# Patient Record
Sex: FEMALE | HISPANIC OR LATINO | Employment: UNEMPLOYED | URBAN - METROPOLITAN AREA
[De-identification: names, ages, dates, MRNs, and addresses within clinical notes are randomized per-mention and may not be internally consistent; named-entity substitution may affect disease eponyms.]

---

## 2017-11-13 ENCOUNTER — HOSPITAL ENCOUNTER (EMERGENCY)
Facility: MEDICAL CENTER | Age: 22
End: 2017-11-13
Attending: EMERGENCY MEDICINE
Payer: COMMERCIAL

## 2017-11-13 ENCOUNTER — APPOINTMENT (OUTPATIENT)
Dept: RADIOLOGY | Facility: MEDICAL CENTER | Age: 22
End: 2017-11-13
Attending: EMERGENCY MEDICINE
Payer: COMMERCIAL

## 2017-11-13 VITALS
HEIGHT: 60 IN | BODY MASS INDEX: 22.16 KG/M2 | DIASTOLIC BLOOD PRESSURE: 71 MMHG | SYSTOLIC BLOOD PRESSURE: 112 MMHG | RESPIRATION RATE: 18 BRPM | TEMPERATURE: 98.6 F | HEART RATE: 65 BPM | WEIGHT: 112.88 LBS | OXYGEN SATURATION: 97 %

## 2017-11-13 DIAGNOSIS — N93.9 ABNORMAL UTERINE BLEEDING: ICD-10-CM

## 2017-11-13 LAB
ALBUMIN SERPL BCP-MCNC: 4 G/DL (ref 3.2–4.9)
ALBUMIN/GLOB SERPL: 1.3 G/DL
ALP SERPL-CCNC: 67 U/L (ref 30–99)
ALT SERPL-CCNC: 13 U/L (ref 2–50)
ANION GAP SERPL CALC-SCNC: 6 MMOL/L (ref 0–11.9)
APPEARANCE UR: CLEAR
AST SERPL-CCNC: 16 U/L (ref 12–45)
BASOPHILS # BLD AUTO: 0.6 % (ref 0–1.8)
BASOPHILS # BLD: 0.05 K/UL (ref 0–0.12)
BILIRUB SERPL-MCNC: 0.6 MG/DL (ref 0.1–1.5)
BUN SERPL-MCNC: 14 MG/DL (ref 8–22)
CALCIUM SERPL-MCNC: 9.4 MG/DL (ref 8.5–10.5)
CHLORIDE SERPL-SCNC: 107 MMOL/L (ref 96–112)
CO2 SERPL-SCNC: 23 MMOL/L (ref 20–33)
COLOR UR AUTO: YELLOW
CREAT SERPL-MCNC: 0.44 MG/DL (ref 0.5–1.4)
EOSINOPHIL # BLD AUTO: 0.13 K/UL (ref 0–0.51)
EOSINOPHIL NFR BLD: 1.5 % (ref 0–6.9)
ERYTHROCYTE [DISTWIDTH] IN BLOOD BY AUTOMATED COUNT: 40.8 FL (ref 35.9–50)
GFR SERPL CREATININE-BSD FRML MDRD: >60 ML/MIN/1.73 M 2
GLOBULIN SER CALC-MCNC: 3.2 G/DL (ref 1.9–3.5)
GLUCOSE SERPL-MCNC: 94 MG/DL (ref 65–99)
GLUCOSE UR QL STRIP.AUTO: NEGATIVE MG/DL
HCG SERPL QL: NEGATIVE
HCG UR QL: NEGATIVE
HCT VFR BLD AUTO: 41.2 % (ref 37–47)
HGB BLD-MCNC: 14 G/DL (ref 12–16)
IMM GRANULOCYTES # BLD AUTO: 0.05 K/UL (ref 0–0.11)
IMM GRANULOCYTES NFR BLD AUTO: 0.6 % (ref 0–0.9)
KETONES UR QL STRIP.AUTO: NEGATIVE MG/DL
LEUKOCYTE ESTERASE UR QL STRIP.AUTO: NEGATIVE
LYMPHOCYTES # BLD AUTO: 1.87 K/UL (ref 1–4.8)
LYMPHOCYTES NFR BLD: 20.9 % (ref 22–41)
MCH RBC QN AUTO: 30.8 PG (ref 27–33)
MCHC RBC AUTO-ENTMCNC: 34 G/DL (ref 33.6–35)
MCV RBC AUTO: 90.5 FL (ref 81.4–97.8)
MONOCYTES # BLD AUTO: 0.54 K/UL (ref 0–0.85)
MONOCYTES NFR BLD AUTO: 6 % (ref 0–13.4)
NEUTROPHILS # BLD AUTO: 6.3 K/UL (ref 2–7.15)
NEUTROPHILS NFR BLD: 70.4 % (ref 44–72)
NITRITE UR QL STRIP.AUTO: NEGATIVE
NRBC # BLD AUTO: 0 K/UL
NRBC BLD AUTO-RTO: 0 /100 WBC
NUMBER OF RH DOSES IND 8505RD: NORMAL
PH UR STRIP.AUTO: 6 [PH]
PLATELET # BLD AUTO: 263 K/UL (ref 164–446)
PMV BLD AUTO: 10.9 FL (ref 9–12.9)
POTASSIUM SERPL-SCNC: 3.8 MMOL/L (ref 3.6–5.5)
PROT SERPL-MCNC: 7.2 G/DL (ref 6–8.2)
PROT UR QL STRIP: NEGATIVE MG/DL
RBC # BLD AUTO: 4.55 M/UL (ref 4.2–5.4)
RBC UR QL AUTO: ABNORMAL
RH BLD: NORMAL
SODIUM SERPL-SCNC: 136 MMOL/L (ref 135–145)
SP GR UR: 1.02
WBC # BLD AUTO: 8.9 K/UL (ref 4.8–10.8)

## 2017-11-13 PROCEDURE — 86901 BLOOD TYPING SEROLOGIC RH(D): CPT

## 2017-11-13 PROCEDURE — 76830 TRANSVAGINAL US NON-OB: CPT

## 2017-11-13 PROCEDURE — 84703 CHORIONIC GONADOTROPIN ASSAY: CPT

## 2017-11-13 PROCEDURE — 80053 COMPREHEN METABOLIC PANEL: CPT

## 2017-11-13 PROCEDURE — 36415 COLL VENOUS BLD VENIPUNCTURE: CPT

## 2017-11-13 PROCEDURE — 85025 COMPLETE CBC W/AUTO DIFF WBC: CPT

## 2017-11-13 PROCEDURE — 99284 EMERGENCY DEPT VISIT MOD MDM: CPT

## 2017-11-13 PROCEDURE — 81025 URINE PREGNANCY TEST: CPT

## 2017-11-13 PROCEDURE — 81002 URINALYSIS NONAUTO W/O SCOPE: CPT

## 2017-11-14 NOTE — ED NOTES
"Pt brought back from [facility], ambulatory with steady gait.     Pt c/o heavy vaginal bleeding x 2 days. States yesterday it was light and today states \"I have already gone through a whole box of pads.\" pt states last week she also had two days of heavy vaginal bleeding. Also states lower pelvic cramping. Pt denies pregnancy. A/o x4, speaking in full sentences.   "

## 2017-11-14 NOTE — ED PROVIDER NOTES
ED Provider Note    CHIEF COMPLAINT  Chief Complaint   Patient presents with   • Vaginal Bleeding       HPI  Josie Prajapati is a 22 y.o. female who presentsTo the emergency department stating that she is having a very heavy menstrual period. The patient says that approximately 2 months ago she had an implantable birth control removed from her left upper arm by her doctor in [facility]. She then experienced some abnormal bleeding for a few days earlier this month and was expecting her regular period began on the 15th but it began today with much heavier bleeding than usual. She is having some slight crampy abdominal discomfort. She has come in for evaluation. She has now moved to [place] and has not yet established a gynecologist in this community    REVIEW OF SYSTEMS no fever or chills cough and difficulty breathing no back pain. No abnormal vaginal discharge except for abnormal bleeding as noted above. All other systems negative    PAST MEDICAL HISTORY  History reviewed. No pertinent past medical history.    FAMILY HISTORY  History reviewed. No pertinent family history.    SOCIAL HISTORY  Social History     Social History   • Marital status: Single     Spouse name: N/A   • Number of children: N/A   • Years of education: N/A     Social History Main Topics   • Smoking status: Never Smoker   • Smokeless tobacco: Never Used   • Alcohol use No   • Drug use: No   • Sexual activity: Not on file     Other Topics Concern   • Not on file     Social History Narrative   • No narrative on file       SURGICAL HISTORY  History reviewed. No pertinent surgical history.    CURRENT MEDICATIONS  Home Medications     Reviewed by Michelle Valencia R.N. (Registered Nurse) on 11/13/17 at 5001  Med List Status: Complete   Medication Last Dose Status        Patient Liborio Taking any Medications                       ALLERGIES  No Known Allergies    PHYSICAL EXAM  VITAL SIGNS: /71   Pulse 65   Temp 37 °C (98.6 °F)  (Temporal)   Resp 18   Ht 1.524 m (5')   Wt 51.2 kg (112 lb 14 oz)   SpO2 97%   BMI 22.04 kg/m²    Oxygen saturation is interpreted asAdequate  Constitutional: Awake and nontoxic appearing  HENT: Mucous membranes are moist and throat clear  Eyes: No erythema or discharge or jaundice  Neck: Trachea midline no JVD  Cardiovascular: Regular rate and rhythm  Lungs: Clear and equal bilaterally with no apparent difficulty breathing  Abdomen/Back: Soft nontender nondistended no rebound or guarding or peritoneal findings L sounds are normally active  Skin: Arm and dry  Musculoskeletal: No acute bony deformity  Neurologic: Awake verbal moving all extremities without difficulty    Laboratory  A CBC shows white blood cell count 8.9 with hemoglobin of 14 complete metabolic panel is unremarkable urine pregnancy test is negative Rh is positive    Radiology  US-GYN-PELVIS TRANSVAGINAL   Final Result      Normal transvaginal appearance of the pelvis.          MEDICAL DECISION MAKING and DISPOSITION  I reviewed all the findings with the patient and at this point in time I think it will for her to go home I think she needs to follow-up with gynecology and she is to call Dr. bonds's office in the morning and arrange gynecology follow-up as soon as possible. If pain is out of control or she feels she is having new or worsening symptoms same in return here for recheck    IMPRESSION  1. Abnormal uterine bleeding         Electronically signed by: Manpreet Brock, 11/13/2017 10:09 PM

## 2017-11-14 NOTE — ED NOTES
Pt comes in complaining of a heavy menstrual cycle and pelvic cramping. Pt stating she was bleeding last week and it stopped then began again last night.

## 2018-05-07 ENCOUNTER — HOSPITAL ENCOUNTER (OUTPATIENT)
Facility: MEDICAL CENTER | Age: 23
End: 2018-05-07
Attending: SPECIALIST | Admitting: SPECIALIST
Payer: MEDICAID

## 2018-08-21 ENCOUNTER — HOSPITAL ENCOUNTER (INPATIENT)
Facility: MEDICAL CENTER | Age: 23
LOS: 1 days | End: 2018-08-22
Attending: SPECIALIST | Admitting: SPECIALIST
Payer: MEDICAID

## 2018-08-21 DIAGNOSIS — R10.2 FEMALE PELVIC PAIN: Primary | ICD-10-CM

## 2018-08-21 LAB
BASOPHILS # BLD AUTO: 0.4 % (ref 0–1.8)
BASOPHILS # BLD: 0.03 K/UL (ref 0–0.12)
EOSINOPHIL # BLD AUTO: 0.06 K/UL (ref 0–0.51)
EOSINOPHIL NFR BLD: 0.8 % (ref 0–6.9)
ERYTHROCYTE [DISTWIDTH] IN BLOOD BY AUTOMATED COUNT: 42.2 FL (ref 35.9–50)
ERYTHROCYTE [DISTWIDTH] IN BLOOD BY AUTOMATED COUNT: 42.3 FL (ref 35.9–50)
HCT VFR BLD AUTO: 36.8 % (ref 37–47)
HCT VFR BLD AUTO: 38.3 % (ref 37–47)
HGB BLD-MCNC: 12.4 G/DL (ref 12–16)
HGB BLD-MCNC: 12.4 G/DL (ref 12–16)
HOLDING TUBE BB 8507: NORMAL
IMM GRANULOCYTES # BLD AUTO: 0.07 K/UL (ref 0–0.11)
IMM GRANULOCYTES NFR BLD AUTO: 0.9 % (ref 0–0.9)
LYMPHOCYTES # BLD AUTO: 2.11 K/UL (ref 1–4.8)
LYMPHOCYTES NFR BLD: 27.8 % (ref 22–41)
MCH RBC QN AUTO: 27.8 PG (ref 27–33)
MCH RBC QN AUTO: 28.7 PG (ref 27–33)
MCHC RBC AUTO-ENTMCNC: 32.4 G/DL (ref 33.6–35)
MCHC RBC AUTO-ENTMCNC: 33.7 G/DL (ref 33.6–35)
MCV RBC AUTO: 85.2 FL (ref 81.4–97.8)
MCV RBC AUTO: 85.9 FL (ref 81.4–97.8)
MONOCYTES # BLD AUTO: 0.55 K/UL (ref 0–0.85)
MONOCYTES NFR BLD AUTO: 7.3 % (ref 0–13.4)
NEUTROPHILS # BLD AUTO: 4.76 K/UL (ref 2–7.15)
NEUTROPHILS NFR BLD: 62.8 % (ref 44–72)
NRBC # BLD AUTO: 0 K/UL
NRBC BLD-RTO: 0 /100 WBC
PLATELET # BLD AUTO: 177 K/UL (ref 164–446)
PLATELET # BLD AUTO: 178 K/UL (ref 164–446)
PMV BLD AUTO: 11.7 FL (ref 9–12.9)
PMV BLD AUTO: 12.7 FL (ref 9–12.9)
RBC # BLD AUTO: 4.32 M/UL (ref 4.2–5.4)
RBC # BLD AUTO: 4.46 M/UL (ref 4.2–5.4)
WBC # BLD AUTO: 15.3 K/UL (ref 4.8–10.8)
WBC # BLD AUTO: 7.6 K/UL (ref 4.8–10.8)

## 2018-08-21 PROCEDURE — 10907ZC DRAINAGE OF AMNIOTIC FLUID, THERAPEUTIC FROM PRODUCTS OF CONCEPTION, VIA NATURAL OR ARTIFICIAL OPENING: ICD-10-PCS | Performed by: SPECIALIST

## 2018-08-21 PROCEDURE — 304965 HCHG RECOVERY SERVICES

## 2018-08-21 PROCEDURE — 36415 COLL VENOUS BLD VENIPUNCTURE: CPT

## 2018-08-21 PROCEDURE — 770002 HCHG ROOM/CARE - OB PRIVATE (112)

## 2018-08-21 PROCEDURE — 85027 COMPLETE CBC AUTOMATED: CPT

## 2018-08-21 PROCEDURE — 700111 HCHG RX REV CODE 636 W/ 250 OVERRIDE (IP): Performed by: SPECIALIST

## 2018-08-21 PROCEDURE — 59409 OBSTETRICAL CARE: CPT

## 2018-08-21 PROCEDURE — 85025 COMPLETE CBC W/AUTO DIFF WBC: CPT

## 2018-08-21 PROCEDURE — 700105 HCHG RX REV CODE 258: Performed by: SPECIALIST

## 2018-08-21 PROCEDURE — A9270 NON-COVERED ITEM OR SERVICE: HCPCS | Performed by: SPECIALIST

## 2018-08-21 PROCEDURE — 700102 HCHG RX REV CODE 250 W/ 637 OVERRIDE(OP): Performed by: SPECIALIST

## 2018-08-21 PROCEDURE — 0HQ9XZZ REPAIR PERINEUM SKIN, EXTERNAL APPROACH: ICD-10-PCS | Performed by: SPECIALIST

## 2018-08-21 RX ORDER — TERBUTALINE SULFATE 1 MG/ML
0.25 INJECTION, SOLUTION SUBCUTANEOUS PRN
Status: DISCONTINUED | OUTPATIENT
Start: 2018-08-21 | End: 2018-08-21 | Stop reason: HOSPADM

## 2018-08-21 RX ORDER — BISACODYL 10 MG
10 SUPPOSITORY, RECTAL RECTAL PRN
Status: DISCONTINUED | OUTPATIENT
Start: 2018-08-21 | End: 2018-08-22 | Stop reason: HOSPADM

## 2018-08-21 RX ORDER — SODIUM CHLORIDE, SODIUM LACTATE, POTASSIUM CHLORIDE, CALCIUM CHLORIDE 600; 310; 30; 20 MG/100ML; MG/100ML; MG/100ML; MG/100ML
INJECTION, SOLUTION INTRAVENOUS CONTINUOUS
Status: DISCONTINUED | OUTPATIENT
Start: 2018-08-21 | End: 2018-08-21 | Stop reason: HOSPADM

## 2018-08-21 RX ORDER — VITAMIN A ACETATE, BETA CAROTENE, ASCORBIC ACID, CHOLECALCIFEROL, .ALPHA.-TOCOPHEROL ACETATE, DL-, THIAMINE MONONITRATE, RIBOFLAVIN, NIACINAMIDE, PYRIDOXINE HYDROCHLORIDE, FOLIC ACID, CYANOCOBALAMIN, CALCIUM CARBONATE, FERROUS FUMARATE, ZINC OXIDE, CUPRIC OXIDE 3080; 12; 120; 400; 1; 1.84; 3; 20; 22; 920; 25; 200; 27; 10; 2 [IU]/1; UG/1; MG/1; [IU]/1; MG/1; MG/1; MG/1; MG/1; MG/1; [IU]/1; MG/1; MG/1; MG/1; MG/1; MG/1
1 TABLET, FILM COATED ORAL EVERY MORNING
Status: DISCONTINUED | OUTPATIENT
Start: 2018-08-21 | End: 2018-08-22 | Stop reason: HOSPADM

## 2018-08-21 RX ORDER — DOCUSATE SODIUM 100 MG/1
100 CAPSULE, LIQUID FILLED ORAL 2 TIMES DAILY PRN
Status: DISCONTINUED | OUTPATIENT
Start: 2018-08-21 | End: 2018-08-22 | Stop reason: HOSPADM

## 2018-08-21 RX ORDER — MISOPROSTOL 200 UG/1
600 TABLET ORAL
Status: DISCONTINUED | OUTPATIENT
Start: 2018-08-21 | End: 2018-08-22 | Stop reason: HOSPADM

## 2018-08-21 RX ORDER — IBUPROFEN 600 MG/1
600 TABLET ORAL EVERY 6 HOURS PRN
Status: DISCONTINUED | OUTPATIENT
Start: 2018-08-21 | End: 2018-08-22 | Stop reason: HOSPADM

## 2018-08-21 RX ORDER — ONDANSETRON 2 MG/ML
4 INJECTION INTRAMUSCULAR; INTRAVENOUS EVERY 6 HOURS PRN
Status: DISCONTINUED | OUTPATIENT
Start: 2018-08-21 | End: 2018-08-22 | Stop reason: HOSPADM

## 2018-08-21 RX ORDER — LIDOCAINE HYDROCHLORIDE 10 MG/ML
INJECTION, SOLUTION EPIDURAL; INFILTRATION; INTRACAUDAL; PERINEURAL
Status: ACTIVE
Start: 2018-08-21 | End: 2018-08-22

## 2018-08-21 RX ORDER — SODIUM CHLORIDE, SODIUM LACTATE, POTASSIUM CHLORIDE, CALCIUM CHLORIDE 600; 310; 30; 20 MG/100ML; MG/100ML; MG/100ML; MG/100ML
INJECTION, SOLUTION INTRAVENOUS PRN
Status: DISCONTINUED | OUTPATIENT
Start: 2018-08-21 | End: 2018-08-22 | Stop reason: HOSPADM

## 2018-08-21 RX ORDER — HYDROCODONE BITARTRATE AND ACETAMINOPHEN 5; 325 MG/1; MG/1
1 TABLET ORAL EVERY 4 HOURS PRN
Status: DISCONTINUED | OUTPATIENT
Start: 2018-08-21 | End: 2018-08-22 | Stop reason: HOSPADM

## 2018-08-21 RX ORDER — HYDROXYZINE 50 MG/1
50 TABLET, FILM COATED ORAL EVERY 6 HOURS PRN
Status: DISCONTINUED | OUTPATIENT
Start: 2018-08-21 | End: 2018-08-21 | Stop reason: HOSPADM

## 2018-08-21 RX ORDER — MISOPROSTOL 200 UG/1
800 TABLET ORAL
Status: DISCONTINUED | OUTPATIENT
Start: 2018-08-21 | End: 2018-08-21 | Stop reason: HOSPADM

## 2018-08-21 RX ORDER — ONDANSETRON 4 MG/1
4 TABLET, ORALLY DISINTEGRATING ORAL EVERY 6 HOURS PRN
Status: DISCONTINUED | OUTPATIENT
Start: 2018-08-21 | End: 2018-08-22 | Stop reason: HOSPADM

## 2018-08-21 RX ORDER — HYDROCODONE BITARTRATE AND ACETAMINOPHEN 10; 325 MG/1; MG/1
1 TABLET ORAL EVERY 4 HOURS PRN
Status: DISCONTINUED | OUTPATIENT
Start: 2018-08-21 | End: 2018-08-22 | Stop reason: HOSPADM

## 2018-08-21 RX ORDER — ACETAMINOPHEN 325 MG/1
325 TABLET ORAL EVERY 4 HOURS PRN
Status: DISCONTINUED | OUTPATIENT
Start: 2018-08-21 | End: 2018-08-22 | Stop reason: HOSPADM

## 2018-08-21 RX ORDER — CITRIC ACID/SODIUM CITRATE 334-500MG
30 SOLUTION, ORAL ORAL EVERY 6 HOURS PRN
Status: DISCONTINUED | OUTPATIENT
Start: 2018-08-21 | End: 2018-08-21 | Stop reason: HOSPADM

## 2018-08-21 RX ADMIN — IBUPROFEN 600 MG: 600 TABLET, FILM COATED ORAL at 13:33

## 2018-08-21 RX ADMIN — Medication 125 ML/HR: at 13:33

## 2018-08-21 RX ADMIN — Medication 2 MILLI-UNITS/MIN: at 06:10

## 2018-08-21 RX ADMIN — SODIUM CHLORIDE, POTASSIUM CHLORIDE, SODIUM LACTATE AND CALCIUM CHLORIDE 125 ML: 600; 310; 30; 20 INJECTION, SOLUTION INTRAVENOUS at 06:10

## 2018-08-21 RX ADMIN — FENTANYL CITRATE 100 MCG: 50 INJECTION, SOLUTION INTRAMUSCULAR; INTRAVENOUS at 10:20

## 2018-08-21 RX ADMIN — IBUPROFEN 600 MG: 600 TABLET, FILM COATED ORAL at 19:20

## 2018-08-21 RX ADMIN — HYDROCODONE BITARTRATE AND ACETAMINOPHEN 1 TABLET: 5; 325 TABLET ORAL at 16:21

## 2018-08-21 ASSESSMENT — LIFESTYLE VARIABLES
EVER_SMOKED: NEVER
ALCOHOL_USE: NO

## 2018-08-21 ASSESSMENT — PATIENT HEALTH QUESTIONNAIRE - PHQ9
3. TROUBLE FALLING OR STAYING ASLEEP OR SLEEPING TOO MUCH: NOT AT ALL
7. TROUBLE CONCENTRATING ON THINGS, SUCH AS READING THE NEWSPAPER OR WATCHING TELEVISION: MORE THAN HALF THE DAYS
6. FEELING BAD ABOUT YOURSELF - OR THAT YOU ARE A FAILURE OR HAVE LET YOURSELF OR YOUR FAMILY DOWN: NEARLY EVERY DAY
5. POOR APPETITE OR OVEREATING: NOT AT ALL
1. LITTLE INTEREST OR PLEASURE IN DOING THINGS: NOT AT ALL
8. MOVING OR SPEAKING SO SLOWLY THAT OTHER PEOPLE COULD HAVE NOTICED. OR THE OPPOSITE, BEING SO FIGETY OR RESTLESS THAT YOU HAVE BEEN MOVING AROUND A LOT MORE THAN USUAL: NOT AT ALL
9. THOUGHTS THAT YOU WOULD BE BETTER OFF DEAD, OR OF HURTING YOURSELF: SEVERAL DAYS
4. FEELING TIRED OR HAVING LITTLE ENERGY: NOT AT ALL
2. FEELING DOWN, DEPRESSED, IRRITABLE, OR HOPELESS: SEVERAL DAYS
SUM OF ALL RESPONSES TO PHQ QUESTIONS 1-9: 7
SUM OF ALL RESPONSES TO PHQ9 QUESTIONS 1 AND 2: 1

## 2018-08-21 ASSESSMENT — PAIN SCALES - GENERAL
PAINLEVEL_OUTOF10: 8
PAINLEVEL_OUTOF10: 3
PAINLEVEL_OUTOF10: 2
PAINLEVEL_OUTOF10: 5
PAINLEVEL_OUTOF10: 8
PAINLEVEL_OUTOF10: 8

## 2018-08-21 NOTE — PROGRESS NOTES
Pt arrived via wheelchair with belongings to room S323. Report received from Wood L&OLIVER RN. Pt oriented to room, call light, bathroom emergency light, skylight & infant security. Assessment done. Fundus firm, lochia light. Vital signs stable. IV infusing 125 of pit in R FA. Pt states pain is 5/10, see MAR/flowsheet. Pt aware of dangers related to sleeping with infant, reviewed plan of care with pt & encouraged to call with needs or prior to ambulating. Call light in place. Will continue to monitor

## 2018-08-21 NOTE — PROGRESS NOTES
, EDC  = 39.6 weeks    0500-Patient presents to L&D for scheduled IOL. Patient denies contractions, leaking of fluid or any vaginal bleeding. EFM and TOCO applied. States positive fetal movement. Admission procedures complete.  0892-Report given to GOKUL Esteves RN.

## 2018-08-21 NOTE — H&P
DATE OF ADMISSION:  2018    REASON FOR ADMISSION:  Augmentation of labor.    HISTORY OF PRESENT ILLNESS:  This is a 23-year-old  2, para 1, at   39-6/7 weeks' gestation based on last menstrual period consistent with a   9-week ultrasound who has been followed by myself and Dr. Reinier Juarez for   evaluation and management of a ventricular septal defect x2, in addition to   borderline bilateral pyelectasis.  The patient has 2 muscular VSDs.  The   patient has been followed closely with gross study and has overall reassuring   fetal status and is now interested in proceeding forward with augmentation of   labor given favorable cervix of 150%, -2 to -3, soft and anterior station.    Risks, benefits and alternatives were addressed.  She asked appropriate   questions and wished to proceed forward with admission as planned.    PAST MEDICAL HISTORY:  Migraine headaches.    PAST SURGICAL HISTORY:  None.    OBSTETRICAL HISTORY:  The patient had one previous delivery, 4 pounds 9 ounces   in .  This is her second pregnancy.    SOCIAL HISTORY:  She denies use of any alcohol, tobacco, or recreational drug   use.    MEDICATIONS:  Prenatal vitamins.    ALLERGIES:  No known drug allergies.    PHYSICAL EXAMINATION:  VITAL SIGNS:  She is afebrile, hemodynamically stable.  HEART:  Regular rate and rhythm.  CHEST:  Clear to auscultation bilaterally.  ABDOMEN:  Soft, gravid, nontender.  PELVIC:  Sterile vaginal exam is as stated above.  EXTREMITIES:  Nontender.    LABORATORY DATA:  Prenatal care labs are all in order.  She is group B strep   negative.    ASSESSMENT AND PLAN:  A 23-year-old  2, para 1, at term with overall   reassuring fetal status with 2 muscular ventricular septal defect in addition   to borderline bilateral pyelectasis who will need an echo and renal ultrasound   after delivery.  Risks, benefits, alternatives of proceeding forward with   augmentation of labor at this time were discussed.  She asked  appropriate   questions and wished to proceed forward with admission as planned.       ____________________________________     MD REY MOSLEY / BURTON    DD:  08/06/2018 15:52:36  DT:  08/06/2018 16:29:37    D#:  [id number]  Job#:  [id number]

## 2018-08-21 NOTE — PROGRESS NOTES
"0700: received report from ROXANNA Mireles RN. , EDC  = [place] gestation at 39.6. Pt in bed with Pitocin and LR infusing, reports feeling contractions but denies discomfort. FOB Nehemias awake at bedside. Plan of care discussed. Dry erase board update and reviewed. EFM/TOCO are in place. Pt and FOB encouraged to call RN for any question, concerns, and/or needs. Dr Oneal at bedside, SVE 3/50/1, MD attempted to AROM, no fluid noted, will continue to monitor.  0825: Dr Oneal phoned RN, checking on pt. No leaking of fluids since MD attempted AROM. Will come in to check pt in a few hours.   1015: Pt called out, RN to bedside, pt stating she wants pain medication. Fentanyl given (see MAR).  1150: Dr Oneal phoned for SVE: , pt feeling \"pushy\". Dr Oneal in OR case but available when needed.  1212: Dr Oneal phoned SVE , pt stating she needs to push. Will be down shortly.  1220: MD at bedside, pushing initiated.  1235: Red Stuart used by MD, sufficient amount of urine drained.  1255:  of Male, 8/9 apgars, baby skin-to-skin.     Recovery: Pt firm/light/at U. Food ordered and will be sent to PP.       "

## 2018-08-21 NOTE — CARE PLAN
Problem: Pain  Goal: Alleviation of Pain or a reduction in pain to the patient's comfort goal  Outcome: PROGRESSING AS EXPECTED  Discussed pain management with pt. Discussed non-pharmaceutical interventions including position changes and relaxation techniques with pt. Pt will notify RN with any changes.     Problem: Risk for Infection, Impaired Wound Healing  Goal: Remain free from signs and symptoms of infection  Pt afebrile. No S/S of infection at this time. Will continue to monitor.

## 2018-08-21 NOTE — OR SURGEON
Immediate Post OP Note        Estimated Blood Loss: 300ccs    Findings:  over first degree midline laceration without any nuchal cord with easy delivery of the shoulders and body with the placenta spontaneous and intact with 3vc with Apgars of 8/9 at one and five min respectively and the laceration was repaired with single interrupted sutures using 3.0 Vicryl.     Complications: None        2018 1:47 PM Jose Oneal M.D.

## 2018-08-21 NOTE — DELIVERY NOTE
DATE OF SERVICE:  2018    Briefly, this is a 23-year-old  2, para 1, at 39-6/7 weeks gestation by   excellent dates who has been followed closely for a ventriculoseptal defect   with borderline pyelectasis.  The infant has 2 muscular VSDs which had been   followed closely with overall reassuring fetal status.  The patient had a   favorable cervix and was interested in proceeding forward with Pitocin   augmentation per protocol.  The patient was started and increased per   protocol, had an artificial rupture of membranes performed with clear amniotic   fluid noted, progressed well during the course of her labor.  She arrived   ____ nearly complete and 0 station.  Patient stated that she wanted to push,   pushed for approximately 35 minutes, subsequently underwent a spontaneous   vaginal delivery over first-degree midline laceration without any nuchal cord   with easy delivery of the shoulders and body.  Cord was clamped and cut.    Infant was handed to waiting nursing staff.  Apgars were 8 and 9 at 1 and 5   minutes respectively.  Placenta was spontaneous and intact with 3-vessel cord.    Repair of the midline laceration was done with 3-0 Vicryl single interrupted   sutures.  Estimated blood loss was 300 mL.  Patient tolerated the labor and   delivery and repair well.       ____________________________________     MD REY MOSLEY / BURTON    DD:  2018 13:50:57  DT:  2018 13:58:32    D#:  [id number]  Job#:  [id number]

## 2018-08-22 VITALS
SYSTOLIC BLOOD PRESSURE: 99 MMHG | TEMPERATURE: 98.4 F | OXYGEN SATURATION: 98 % | HEIGHT: 61 IN | HEART RATE: 76 BPM | BODY MASS INDEX: 27.38 KG/M2 | WEIGHT: 145 LBS | DIASTOLIC BLOOD PRESSURE: 58 MMHG | RESPIRATION RATE: 18 BRPM

## 2018-08-22 PROCEDURE — 700102 HCHG RX REV CODE 250 W/ 637 OVERRIDE(OP): Performed by: SPECIALIST

## 2018-08-22 PROCEDURE — A9270 NON-COVERED ITEM OR SERVICE: HCPCS | Performed by: SPECIALIST

## 2018-08-22 RX ORDER — HYDROCODONE BITARTRATE AND ACETAMINOPHEN 5; 325 MG/1; MG/1
1 TABLET ORAL EVERY 4 HOURS PRN
Qty: 20 TAB | Refills: 0 | Status: SHIPPED | OUTPATIENT
Start: 2018-08-22 | End: 2018-08-29

## 2018-08-22 RX ORDER — IBUPROFEN 600 MG/1
600 TABLET ORAL EVERY 6 HOURS PRN
Qty: 30 TAB | Refills: 0 | Status: SHIPPED | OUTPATIENT
Start: 2018-08-22 | End: 2018-11-29

## 2018-08-22 RX ADMIN — IBUPROFEN 600 MG: 600 TABLET, FILM COATED ORAL at 07:55

## 2018-08-22 RX ADMIN — HYDROCODONE BITARTRATE AND ACETAMINOPHEN 1 TABLET: 5; 325 TABLET ORAL at 01:26

## 2018-08-22 RX ADMIN — IBUPROFEN 600 MG: 600 TABLET, FILM COATED ORAL at 01:26

## 2018-08-22 RX ADMIN — IBUPROFEN 600 MG: 600 TABLET, FILM COATED ORAL at 14:36

## 2018-08-22 ASSESSMENT — PAIN SCALES - GENERAL
PAINLEVEL_OUTOF10: 3
PAINLEVEL_OUTOF10: 2
PAINLEVEL_OUTOF10: 6
PAINLEVEL_OUTOF10: 5
PAINLEVEL_OUTOF10: 5

## 2018-08-22 NOTE — CONSULTS
MOB holding infant. Reports breastfeeding going well and also bottle feeds infant. Teaching done on Paced bottle feeding with voiced understanding; Encourage to watch Youtube video. Offer support through [facility] with the Breastfeeding [facility]- times and days given. MOB has WIC; Encourage to follow up for lactation supprt with WIC advisor.     Breastfeeding POC:     Breastfeeding on cue a minimum of 8x/24 hours. MOB intends on supplementing following breastfeed with formula using paced feeding method. Access lactation support as needed through WIC or through [facility].

## 2018-08-22 NOTE — PROGRESS NOTES
Progress Note    Subjective:   Doing well. No issues or concerns. Pain well controlled. Stephon pos. Bf well    Objective Data:  Recent Labs      08/21/18   0530  08/21/18   2134   WBC  7.6  15.3*   RBC  4.32  4.46   HEMOGLOBIN  12.4  12.4   HEMATOCRIT  36.8*  38.3   MCV  85.2  85.9   MCH  28.7  27.8   MCHC  33.7  32.4*   RDW  42.2  42.3   PLATELETCT  178  177   MPV  12.7  11.7           Vitals:    08/21/18 0700 08/21/18 1325 08/21/18 1530 08/21/18 2000   BP:  124/70 112/66 108/66   Pulse:  90 69 83   Resp:   18 16   Temp: 36.6 °C (97.9 °F) 36.9 °C (98.5 °F) 36.6 °C (97.8 °F) 37.1 °C (98.7 °F)   TempSrc: Temporal      SpO2:    94%   Weight:       Height:         ABdomen: soft non tender fundus at umbilicus  Perineum: no sig bleeding or discharge  Ext:n on tender calves    No intake or output data in the 24 hours ending 08/22/18 0442    Current Facility-Administered Medications   Medication Dose Route Frequency Provider Last Rate Last Dose   • ondansetron (ZOFRAN ODT) dispertab 4 mg  4 mg Oral Q6HRS PRN Jose Oneal M.D.        Or   • ondansetron (ZOFRAN) syringe/vial injection 4 mg  4 mg Intravenous Q6HRS PRN Jose Oneal M.D.       • oxytocin (PITOCIN) infusion (for postpartum)  2,000 mL/hr Intravenous Once Jose Oneal M.D.        Followed by   • oxytocin (PITOCIN) infusion (for postpartum)   mL/hr Intravenous Continuous Jose Oneal M.D.   Stopped at 08/21/18 2200   • ibuprofen (MOTRIN) tablet 600 mg  600 mg Oral Q6HRS PRN Jose Oneal M.D.   600 mg at 08/22/18 0126   • acetaminophen (TYLENOL) tablet 325 mg  325 mg Oral Q4HRS PRN Jose Oneal M.D.       • HYDROcodone-acetaminophen (NORCO) 5-325 MG per tablet 1 Tab  1 Tab Oral Q4HRS PRN Jose Oneal M.D.   1 Tab at 08/22/18 0126   • HYDROcodone/acetaminophen (NORCO)  MG per tablet 1 Tab  1 Tab Oral Q4HRS PRN Jose Oneal M.D.       • LR infusion   Intravenous PRN Jose Oneal M.D.       • miSOPROStol (CYTOTEC) tablet 600  mcg  600 mcg Rectal Once PRN Jose Oneal M.D.       • docusate sodium (COLACE) capsule 100 mg  100 mg Oral BID PRN Jose Oneal M.D.       • bisacodyl (DULCOLAX) suppository 10 mg  10 mg Rectal PRN Jose Oneal M.D.       • prenatal plus vitamin (STUARTNATAL 1+1) 27-1 MG tablet 1 Tab  1 Tab Oral QAM Jose Oneal M.D.           A/P S/P . Doing well. No issues or concerns. Pain well controlled. Plan to proceed with discharge home today. All questions were answered today.

## 2018-08-22 NOTE — PROGRESS NOTES
Discharge teaching reviewed with patient, all questions answered. Pt given all written information on self and infant care, including prescriptions and follow-up instructions. Pt doing well with infant, and feels comfortable with her feeding plan.  Discharged to home at 1503. Escorted out in wheelchair by staff.

## 2018-08-22 NOTE — DISCHARGE SUMMARY
DATE OF ADMISSION:  2018    DATE OF DISCHARGE:  2018    DISCHARGE DIAGNOSES:  1.  Status post spontaneous vaginal delivery.  2.  Uncomplicated postpartum course.    HISTORY OF PRESENT ILLNESS:  This is a 23-year-old  2, para 1, at   39-6/7 weeks gestation, who was admitted with a plan to proceed forward with   Pitocin augmentation.  Risks, benefits and alternatives have been addressed.    She asked appropriate questions, signed the appropriate consents, wished to   proceed forward with admission.    PAST MEDICAL HISTORY AND PHYSICAL EXAMINATION:  Can be found in dictated   history and physical.    ASSESSMENT AND PLAN:  A 23-year-old  2, para 1, at term with overall   reassuring fetal status, elects to proceed forward with admission and   anticipated vaginal delivery after augmentation of labor.    HOSPITAL COURSE:  As stated above, the patient was admitted.  She was started   on Pitocin, which is increased per protocol, had an artificial rupture of   membranes, performed clear amniotic fluid, subsequently underwent a   spontaneous vaginal delivery.  Apgars were 8 and 9 at one and five minutes   respectively.  On postpartum day #1, she had met all discharge criteria, she   was ambulating and voiding well, tolerating a regular diet, she was   breastfeeding well, her pain was well controlled with Motrin and Norco, for   which she will be discharged home.    DISCHARGE FOLLOWUP:  In 6 weeks.    DISCHARGE INSTRUCTIONS:  She is to call if any increased temperature greater   than 100.4, increasing vaginal bleeding, abdominal pain unrelieved with any   p.o. pain medication, call with any other questions or concerns.       ____________________________________     MD REY MOSLEY / BURTON    DD:  2018 04:47:54  DT:  2018 04:58:48    D#:  [id number]  Job#:  [id number]

## 2018-08-22 NOTE — CARE PLAN
Problem: Alteration in comfort related to episiotomy, vaginal repair and/or after birth pains  Goal: Patient is able to ambulate, care for self and infant  Outcome: PROGRESSING AS EXPECTED  Patient is able to care for self and baby with tolerable pain levels with medication.    Problem: Potential anxiety related to difficulty adapting to parental role  Goal: Patient will verbalize and demonstrate effective bonding and parenting behavior  Outcome: PROGRESSING AS EXPECTED  Patient is bonding well with baby, recognizing cues, and responding appropriately.

## 2018-08-22 NOTE — DISCHARGE INSTRUCTIONS
POSTPARTUM DISCHARGE INSTRUCTIONS FOR MOM    YOB: 1995   Age: 23 y.o.               Admit Date: 2018     Discharge Date: 2018  Attending Doctor:  Jose Oneal M.D.                  Allergies:  Patient has no known allergies.    Discharged to home by car. Discharged via wheelchair, hospital escort: Yes.  Special equipment needed: Not Applicable  Belongings with: Personal  Be sure to schedule a follow-up appointment with your primary care doctor or any specialists as instructed.     Discharge Plan:   Diet Plan: Discussed  Activity Level: Discussed  Confirmed Follow up Appointment: Patient to Call and Schedule Appointment  Confirmed Symptoms Management: Discussed  Medication Reconciliation Updated: Yes  Influenza Vaccine Indication: Patient Refuses    REASONS TO CALL YOUR OBSTETRICIAN:  1.   Persistent fever or shaking chills (Temperature higher than 100.4)  2.   Heavy bleeding (soaking more than 1 pad per hour); Passing clots  3.   Foul odor from vagina  4.   Mastitis (Breast infection; breast pain, chills, fever, redness)  5.   Urinary pain, burning or frequency  6.   Episiotomy infection  7.   Abdominal incision infection  8.   Severe depression longer than 24 hours    HAND WASHING  · Prior to handling the baby.  · Before breastfeeding or bottle feeding baby.  · After using the bathroom or changing the baby's diaper.    WOUND CARE  Ask your physician for additional care instructions.  In general:    ·  Incision:      · Keep clean and dry.    · Do NOT lift anything heavier than your baby for up to 6 weeks.    · There should not be any opening or pus.      VAGINAL CARE  · Nothing inside vagina for 6 weeks: no sexual intercourse, tampons or douching.  · Bleeding may continue for 2-4 weeks.  Amount may vary.    · Call your physician for heavy bleeding which means soaking more than 1 pad per hour    BIRTH CONTROL  · It is possible to become pregnant at any time after delivery and while  "breastfeeding.  · Plan to discuss a method of birth control with your physician at your follow up visit. visit.    DIET AND ELIMINATION  · Eating more fiber (bran cereal, fruits, and vegetables) and drinking plenty of fluids will help to avoid constipation.  · Urinary frequency after childbirth is normal.    POSTPARTUM BLUES  During the first few days after birth, you may experience a sense of the \"blues\" which may include impatience, irritability or even crying.  These feeling come and go quickly.  However, as many as 1 in 10 women experience emotional symptoms known as postpartum depression.    Postpartum depression:  May start as early as the second or third day after delivery or take several weeks or months to develop.  Symptoms of \"blues\" are present, but are more intense:  Crying spells; loss of appetite; feelings of hopelessness or loss of control; fear of touching the baby; over concern or no concern at all about the baby; little or no concern about your own appearance/caring for yourself; and/or inability to sleep or excessive sleeping.  Contact your physician if you are experiencing any of these symptoms.    Crisis Hotline:  · [place] Crisis Hotline:  4-014-WQQGIFT  Or 1-719.863.8074  · [place] Crisis Hotline:  1-978.710.4935  Or 799-040-3509    PREVENTING SHAKEN BABY:  If you are angry or stressed, PUT THE BABY IN THE CRIB, step away, take some deep breaths, and wait until you are calm to care for the baby.  DO NOT SHAKE THE BABY.  You are not alone, call a supporter for help.    · Crisis Call Center 24/7 crisis line 679-188-1437 or 1-732.254.2332  · You can also text them, text \"ANSWER\" to 180858    QUIT SMOKING/TOBACCO USE:  I understand the use of any tobacco products increases my chance of suffering from future heart disease and could cause other illnesses which may shorten my life. Quitting the use of tobacco products is the single most important thing I can do to improve my health. For further " information on smoking / tobacco cessation call a Toll Free Quit Line at 1-562.790.2735 (*[employer] [place]) or 1-160.105.4645 (American Lung Association) or you can access the web based program at www.lungusa.org.    · [place] Tobacco Users Help Line:  (286) 810-5379       Toll Free: 1-776.153.2170  · Quit Tobacco Program [facility] (677)627-0006    DEPRESSION / SUICIDE RISK:  As you are discharged from this [facility], it is important to learn how to keep safe from harming yourself.    Recognize the warning signs:  · Abrupt changes in personality, positive or negative- including increase in energy   · Giving away possessions  · Change in eating patterns- significant weight changes-  positive or negative  · Change in sleeping patterns- unable to sleep or sleeping all the time   · Unwillingness or inability to communicate  · Depression  · Unusual sadness, discouragement and loneliness  · Talk of wanting to die  · Neglect of personal appearance   · Rebelliousness- reckless behavior  · Withdrawal from people/activities they love  · Confusion- inability to concentrate     If you or a loved one observes any of these behaviors or has concerns about self-harm, here's what you can do:  · Talk about it- your feelings and reasons for harming yourself  · Remove any means that you might use to hurt yourself (examples: pills, rope, extension cords, firearm)  · Get professional help from the community (Mental Health, Substance Abuse, psychological counseling)  · Do not be alone:Call your Safe Contact- someone whom you trust who will be there for you.  · Call your local CRISIS HOTLINE 955-0086 or 869-854-6231  · Call your local Children's Mobile Crisis Response Team [place] (046) 020-0811 or www.Back&  · Call the toll free National Suicide Prevention Hotlines   · National Suicide Prevention Lifeline 832-011-LSGN (8788)  · National Hope Line Network 800-SUICIDE  (296-1175)    DISCHARGE SURVEY:  Thank you for choosing [facility].  We hope we provided you with very good care.  You may be receiving a survey in the mail.  Please fill it out.  Your opinion is valuable to us.    ADDITIONAL EDUCATIONAL MATERIALS GIVEN TO PATIENT:  Discharge the patient home when meets discharge criteria. Patient needs discharge instructions. She was given her pain medication this am.    My signature on this form indicates that:  1.  I have reviewed and understand the above information  2.  My questions regarding this information have been answered to my satisfaction.  3.  I have formulated a plan with my discharge nurse to obtain my prescribed medication for home.

## 2018-08-22 NOTE — DISCHARGE PLANNING
:    Discharge Planning Assessment Post Partum    Reason for Referral: History of depression  Address: [address] [place] [address].  [place] [address]  Phone: [phone]  Type of Living Situation: living with FOB and daughter  Mom Diagnosis: Pregnancy  Baby Diagnosis:   Primary Language: MOB does speak English    Name of Baby: Sam Mckenzie (: 18)  Father of the Baby: Nehemias Mckenzie   Involved in baby’s care? Yes  Contact Information: [phone]    Prenatal Care: Yes  Mom's PCP: None  PCP for new baby:Dr. Lynch    Support System: FOB  Coping/Bonding between mother & baby: Yes  Source of Feeding: Formula  Supplies for Infant: Prepared, denies any needs    Mom's Insurance: Medicaid  Baby Covered on Insurance:Yes  Mother Employed/School: No  Other children in the home/names & ages: 5 year old daughter    Financial Hardship/Income: No  Mom's Mental status: A&Ox4  Services used prior to admit: Medicaid and WIC    CPS History: No  Psychiatric History: history of depression  Domestic Violence History: No  Drug/ETOH History: No    Resources Provided: children and family resource list, counseling resource specializing in post partum  Referrals Made: diaper bank referral provided     Clearance for Discharge: Infant is cleared to discharge home with parents.    Ongoing Plan: SW met with parents, this is their second baby.  MOB stated she did have post partum depression after her first baby was born.  She denies any current symptoms of depression.  Provided MOB with a counseling resource and recommended she contact her OB if she experiences any signs or symptoms.

## 2018-08-22 NOTE — CARE PLAN
Problem: Altered physiologic condition related to immediate post-delivery state and potential for bleeding/hemorrhage  Goal: Patient physiologically stable as evidenced by normal lochia, palpable uterine involution and vital signs within normal limits  Outcome: PROGRESSING AS EXPECTED  Fundus firm @ U, lochia rubra minimal. V/S within defined parameters.     Problem: Alteration in comfort related to episiotomy, vaginal repair and/or after birth pains  Goal: Patient verbalizes acceptable pain level  Outcome: PROGRESSING AS EXPECTED  Patient will be medicated as needed. Pain controlled @ this time.

## 2018-11-29 ENCOUNTER — OFFICE VISIT (OUTPATIENT)
Dept: MEDICAL GROUP | Facility: MEDICAL CENTER | Age: 23
End: 2018-11-29
Attending: INTERNAL MEDICINE
Payer: MEDICAID

## 2018-11-29 VITALS
OXYGEN SATURATION: 98 % | RESPIRATION RATE: 16 BRPM | HEIGHT: 61 IN | TEMPERATURE: 98.1 F | SYSTOLIC BLOOD PRESSURE: 100 MMHG | WEIGHT: 122.7 LBS | DIASTOLIC BLOOD PRESSURE: 70 MMHG | BODY MASS INDEX: 23.17 KG/M2 | HEART RATE: 70 BPM

## 2018-11-29 DIAGNOSIS — Z23 NEED FOR VACCINATION: ICD-10-CM

## 2018-11-29 DIAGNOSIS — K52.9 CHRONIC DIARRHEA: ICD-10-CM

## 2018-11-29 PROBLEM — Z97.5 NEXPLANON IN PLACE: Status: ACTIVE | Noted: 2018-11-29

## 2018-11-29 PROBLEM — R19.7 DIARRHEA: Status: ACTIVE | Noted: 2018-11-29

## 2018-11-29 PROCEDURE — 99203 OFFICE O/P NEW LOW 30 MIN: CPT | Mod: 25 | Performed by: INTERNAL MEDICINE

## 2018-11-29 PROCEDURE — 90715 TDAP VACCINE 7 YRS/> IM: CPT

## 2018-11-29 PROCEDURE — 90686 IIV4 VACC NO PRSV 0.5 ML IM: CPT

## 2018-11-29 PROCEDURE — 99204 OFFICE O/P NEW MOD 45 MIN: CPT | Performed by: INTERNAL MEDICINE

## 2018-11-29 ASSESSMENT — PAIN SCALES - GENERAL: PAINLEVEL: NO PAIN

## 2018-11-29 NOTE — LETTER
[facility]  Michelle Smith M.D.  [address]  [place] [address]  Fax: [phone]   Authorization for Release/Disclosure of   Protected Health Information   Name: MORENA PINEDA : 1995 SSN: [id number]   Address: [address] [place] [address] Phone:    [phone] (home)    I authorize the entity listed below to release/disclose the PHI below to:   [facility]/Michelle Smith M.D. and Michelle Smith M.D.   Provider or Entity Name:     Address   City, State, Zip   Phone:      Fax:     Reason for request: continuity of care   Information to be released:    [  ] LAST COLONOSCOPY,  including any PATH REPORT and follow-up  [  ] LAST FIT/COLOGUARD RESULT [  ] LAST DEXA  [  ] LAST MAMMOGRAM  [  x] LAST PAP  [  ] LAST LABS [  ] RETINA EXAM REPORT  [  ] IMMUNIZATION RECORDS  [  ] Release all info      [  ] Check here and initial the line next to each item to release ALL health information INCLUDING  _____ Care and treatment for drug and / or alcohol abuse  _____ HIV testing, infection status, or AIDS  _____ Genetic Testing    DATES OF SERVICE OR TIME PERIOD TO BE DISCLOSED: _____________  I understand and acknowledge that:  * This Authorization may be revoked at any time by you in writing, except if your health information has already been used or disclosed.  * Your health information that will be used or disclosed as a result of you signing this authorization could be re-disclosed by the recipient. If this occurs, your re-disclosed health information may no longer be protected by State or Federal laws.  * You may refuse to sign this Authorization. Your refusal will not affect your ability to obtain treatment.  * This Authorization becomes effective upon signing and will  on (date) __________.      If no date is indicated, this Authorization will  one (1) year from the signature date.    Name: Morena Pineda    Signature:   Date:     2018            PLEASE FAX REQUESTED RECORDS BACK TO: [phone]

## 2018-11-29 NOTE — ASSESSMENT & PLAN NOTE
Patient reports that ever since she gave birth about 3 months ago, she has had persistent diarrhea.  She will have 5-6 episodes of loose bowel movements a day.  She denies nocturnal awakening with diarrhea.  She has had 2 episodes of fecal incontinence.  States that she has diarrhea approximately 4 days/week.  The other 3 days, she may have constipation.  She reports prior to becoming pregnant, she did suffer from chronic constipation and she also had to take milk of magnesia while she was pregnant to help with bowel movements.  She denies any nausea or vomiting.  She reports her appetite has been normal and there has been no changes in her diet.  She has some mild periumbilical and suprapubic abdominal pain that comes and goes.  She denies fevers and chills.  She reports suffering with recurrent UTIs and was treated numerous times during her pregnancy with antibiotics.  She thinks the last time she was on antibiotics about 4 months ago.  She is not sure what she took.  She denies melena, hematochezia, GERD.  She did see her gynecologist for this problem.  States that he gave her a medication for diarrhea which she took for 1 day and it caused constipation so she stopped it.  After that, the diarrhea resumed.

## 2018-11-29 NOTE — PROGRESS NOTES
Josie Prajapati is a 23 y.o. female here for diarrhea for 3 months, hospitals care  HPI: No previous PCP  Chronic diarrhea  Patient reports that ever since she gave birth about 3 months ago, she has had persistent diarrhea.  She will have 5-6 episodes of loose bowel movements a day.  She denies nocturnal awakening with diarrhea.  She has had 2 episodes of fecal incontinence.  States that she has diarrhea approximately 4 days/week.  The other 3 days, she may have constipation.  She reports prior to becoming pregnant, she did suffer from chronic constipation and she also had to take milk of magnesia while she was pregnant to help with bowel movements.  She denies any nausea or vomiting.  She reports her appetite has been normal and there has been no changes in her diet.  She has some mild periumbilical and suprapubic abdominal pain that comes and goes.  She denies fevers and chills.  She reports suffering with recurrent UTIs and was treated numerous times during her pregnancy with antibiotics.  She thinks the last time she was on antibiotics about 4 months ago.  She is not sure what she took.  She denies melena, hematochezia, GERD.  She did see her gynecologist for this problem.  States that he gave her a medication for diarrhea which she took for 1 day and it caused constipation so she stopped it.  After that, the diarrhea resumed.    Current medicines (including changes today)  No current outpatient prescriptions on file.     No current facility-administered medications for this visit.      She  has no significant past medical history  She  has no past surgical history.  Social History   Substance Use Topics   • Smoking status: Never Smoker   • Smokeless tobacco: Never Used   • Alcohol use 1.8 oz/week     3 Cans of beer per week     Social History     Social History Narrative   • No narrative on file     Family History   Problem Relation Age of Onset   • Hypertension Mother    • Hyperlipidemia Father    •  "Cancer Cousin         leukemia   • Diabetes Neg Hx    • Heart Disease Neg Hx    • Stroke Neg Hx          ROS  As above in HPI  All other systems reviewed and are negative     Objective:     Blood pressure 100/70, pulse 70, temperature 36.7 °C (98.1 °F), temperature source Temporal, resp. rate 16, height 1.549 m (5' 1\"), weight 55.7 kg (122 lb 11.2 oz), SpO2 98 %, currently breastfeeding. Body mass index is 23.18 kg/m².  Physical Exam:    Constitutional: Alert, no distress.  Skin: Warm, dry, good turgor, no rashes in visible areas.  Eye: Equal, round and reactive, conjunctiva clear, lids normal.  ENMT: Lips without lesions, good dentition, oropharynx clear, TM's clear bilaterally.  Neck: Trachea midline, no masses, no thyromegaly. No cervical or supraclavicular lymphadenopathy.  Respiratory: Unlabored respiratory effort, lungs clear to auscultation, no wheezes, no ronchi.  Cardiovascular: Regular rate and rhythm, no murmurs appreciated, no lower extremity edema.  Abdomen: Soft, bowel sounds present, non-distended, mild tenderness to palpation in taisha-umbilical region without rebound or guarding, no masses, no hepatosplenomegaly.  Psych: Alert and oriented x3, normal affect and mood.        Assessment and Plan:   The following treatment plan was discussed    1. Chronic diarrhea  Unclear etiology.  Given onset of symptoms after a hospitalization for pregnancy and antibiotics use close to the proximity of onset of symptoms, we will rule out C. difficile.  I think this is unlikely given the duration of her symptoms over 3 months and some episodes of constipation mixed in, but it is important to evaluate for.  Basic blood work ordered as below.  I have asked the patient to figure out which medication she was given for the treatment of the diarrhea.  Would consider trial of Imodium if infectious serology comes back normal.  Would also consider referral to GI if symptoms persist and below workup is negative.  - C DIFFICILE " TOXINS A+B, EIA  - Complete O&P; Future  - CBC WITH DIFFERENTIAL; Future  - COMP METABOLIC PANEL; Future  - TSH WITH REFLEX TO FT4; Future  - CELIAC DISEASE AB PANEL; Future  -Consider referral to GI if above workup is normal and symptoms persist  -Consider Imodium if no evidence of infectious etiology    2. Need for vaccination  - Influenza Vaccine Quad Injection >3Y (PF)  - TDAP VACCINE =>6YO IM        Followup: Return in about 1 year (around 11/29/2019), or if symptoms worsen or fail to improve, for annual.  We will follow-up by phone regarding patient's labs and institute above plan by phone

## 2019-05-10 NOTE — DISCHARGE INSTRUCTIONS
Patient called in to state that she could no longer have surgery on 5/23 because that was her sons last day of school . Pt stated that she could do a date of 6/12 for sure. I told pt I would let  know. Pt verbalized understanding.    Use Tylenol and Motrin if needed for discomfort. Follow-up with the gynecologist as soon as possible. Return here if there are new or worsening symptoms.

## 2020-04-17 ENCOUNTER — OFFICE VISIT (OUTPATIENT)
Dept: MEDICAL GROUP | Facility: MEDICAL CENTER | Age: 25
End: 2020-04-17
Attending: INTERNAL MEDICINE
Payer: MEDICAID

## 2020-04-17 VITALS
BODY MASS INDEX: 26.06 KG/M2 | DIASTOLIC BLOOD PRESSURE: 70 MMHG | OXYGEN SATURATION: 97 % | HEIGHT: 61 IN | RESPIRATION RATE: 16 BRPM | WEIGHT: 138 LBS | HEART RATE: 80 BPM | SYSTOLIC BLOOD PRESSURE: 100 MMHG | TEMPERATURE: 97.9 F

## 2020-04-17 DIAGNOSIS — F41.9 ANXIETY: ICD-10-CM

## 2020-04-17 PROCEDURE — 99214 OFFICE O/P EST MOD 30 MIN: CPT | Performed by: INTERNAL MEDICINE

## 2020-04-17 PROCEDURE — 99212 OFFICE O/P EST SF 10 MIN: CPT | Performed by: INTERNAL MEDICINE

## 2020-04-17 RX ORDER — HYDROXYZINE HYDROCHLORIDE 25 MG/1
25 TABLET, FILM COATED ORAL 3 TIMES DAILY PRN
Qty: 30 TAB | Refills: 0 | Status: SHIPPED | OUTPATIENT
Start: 2020-04-17 | End: 2020-12-04 | Stop reason: SDUPTHER

## 2020-04-17 ASSESSMENT — PAIN SCALES - GENERAL: PAINLEVEL: NO PAIN

## 2020-04-17 ASSESSMENT — PATIENT HEALTH QUESTIONNAIRE - PHQ9: CLINICAL INTERPRETATION OF PHQ2 SCORE: 0

## 2020-04-17 NOTE — PROGRESS NOTES
"Subjective:   Josie Prajapati is a 24 y.o. female here today for worsening anxiety    Anxiety  She presents today for worsening anxiety for the past month mostly centering around the coronavirus pandemic.  She states that she has started to have difficulty sleeping and she is very worried about her children getting infected with the virus.  She is also experiencing episodes of chest tightness, difficulty breathing, sweating, and feeling intense anxiety during the day.  She has noticed some hair loss as well.  In the past, she has struggled with anxiety especially when she was pregnant but she never took any medication for it.  She is not currently seeing a therapist or counselor.         Current medicines (including changes today)  Current Outpatient Medications   Medication Sig Dispense Refill   • hydrOXYzine HCl (ATARAX) 25 MG Tab Take 1 Tab by mouth 3 times a day as needed for Anxiety. 30 Tab 0     No current facility-administered medications for this visit.      She  has a past medical history of Anxiety.    ROS   Denies depression  She is complaining of sensation of \"water in my ear\" after inserting a Q-tip to deeply yesterday but denies any ear pain  As above in HPI     Objective:     Vitals:    04/17/20 1559   BP: 100/70   Pulse: 80   Resp: 16   Temp: 36.6 °C (97.9 °F)   SpO2: 97%     Body mass index is 26.09 kg/m².   Physical Exam:  Constitutional: Alert, no distress.  Skin: Warm, dry, good turgor, no rashes in visible areas.  Eye: Equal, round and reactive, conjunctiva clear, lids normal.  ENT: TM's clear bilaterally, no cerumen  Psych: Alert and oriented x3, anxious appearing      Assessment and Plan:   The following treatment plan was discussed    1. Anxiety  Uncontrolled, mostly situational in the setting of coronavirus.  We will start her on hydroxyzine as needed.  Discussed referral to therapy but she prefers to hold off at this time.  She will follow-up with me by my chart in the next 1 to 2 " weeks regarding efficacy.  If ineffective, we will plan on setting up a phone visit.  - hydrOXYzine HCl (ATARAX) 25 MG Tab; Take 1 Tab by mouth 3 times a day as needed for Anxiety.  Dispense: 30 Tab; Refill: 0        Followup: Return in about 2 weeks (around 5/1/2020), or if symptoms worsen or fail to improve, for anxiety via mychart.

## 2020-04-17 NOTE — ASSESSMENT & PLAN NOTE
She presents today for worsening anxiety for the past month mostly centering around the coronavirus pandemic.  She states that she has started to have difficulty sleeping and she is very worried about her children getting infected with the virus.  She is also experiencing episodes of chest tightness, difficulty breathing, sweating, and feeling intense anxiety during the day.  She has noticed some hair loss as well.  In the past, she has struggled with anxiety especially when she was pregnant but she never took any medication for it.  She is not currently seeing a therapist or counselor.

## 2020-05-04 ENCOUNTER — PATIENT MESSAGE (OUTPATIENT)
Dept: MEDICAL GROUP | Facility: MEDICAL CENTER | Age: 25
End: 2020-05-04

## 2020-05-05 NOTE — TELEPHONE ENCOUNTER
From: Josie Prajapati  To: Michelle Smith M.D.  Sent: 5/4/2020 5:23 PM PDT  Subject: Non-Urgent Medical Question    Hey my hand is like peeling off an it like burns I don’t know what it really can be is there something I can take for it and it’s had like slots of cuts it even hurts when I wash my hand it’s get red an purple at times

## 2020-05-21 DIAGNOSIS — R09.81 SINUS CONGESTION: ICD-10-CM

## 2020-05-21 RX ORDER — FLUTICASONE PROPIONATE 50 MCG
1 SPRAY, SUSPENSION (ML) NASAL DAILY
Qty: 16 G | Refills: 11 | Status: SHIPPED | OUTPATIENT
Start: 2020-05-21 | End: 2020-05-21 | Stop reason: SDUPTHER

## 2020-05-21 RX ORDER — FLUTICASONE PROPIONATE 50 MCG
1 SPRAY, SUSPENSION (ML) NASAL DAILY
Qty: 16 G | Refills: 11 | Status: SHIPPED | OUTPATIENT
Start: 2020-05-21 | End: 2021-12-27

## 2020-11-23 ENCOUNTER — PATIENT MESSAGE (OUTPATIENT)
Dept: MEDICAL GROUP | Facility: MEDICAL CENTER | Age: 25
End: 2020-11-23

## 2020-12-04 ENCOUNTER — HOSPITAL ENCOUNTER (OUTPATIENT)
Facility: MEDICAL CENTER | Age: 25
End: 2020-12-04
Attending: INTERNAL MEDICINE
Payer: MEDICAID

## 2020-12-04 ENCOUNTER — OFFICE VISIT (OUTPATIENT)
Dept: MEDICAL GROUP | Facility: MEDICAL CENTER | Age: 25
End: 2020-12-04
Attending: INTERNAL MEDICINE
Payer: MEDICAID

## 2020-12-04 VITALS
WEIGHT: 137 LBS | DIASTOLIC BLOOD PRESSURE: 70 MMHG | RESPIRATION RATE: 16 BRPM | HEIGHT: 61 IN | OXYGEN SATURATION: 97 % | TEMPERATURE: 97.7 F | SYSTOLIC BLOOD PRESSURE: 100 MMHG | HEART RATE: 74 BPM | BODY MASS INDEX: 25.86 KG/M2

## 2020-12-04 DIAGNOSIS — N89.8 VAGINAL DISCHARGE: ICD-10-CM

## 2020-12-04 DIAGNOSIS — N64.4 BREAST PAIN, RIGHT: ICD-10-CM

## 2020-12-04 DIAGNOSIS — F41.9 ANXIETY: ICD-10-CM

## 2020-12-04 DIAGNOSIS — Z12.4 SCREENING FOR MALIGNANT NEOPLASM OF CERVIX: ICD-10-CM

## 2020-12-04 PROCEDURE — 99213 OFFICE O/P EST LOW 20 MIN: CPT | Performed by: INTERNAL MEDICINE

## 2020-12-04 PROCEDURE — 99213 OFFICE O/P EST LOW 20 MIN: CPT | Mod: 25 | Performed by: INTERNAL MEDICINE

## 2020-12-04 RX ORDER — HYDROXYZINE HYDROCHLORIDE 25 MG/1
25 TABLET, FILM COATED ORAL 3 TIMES DAILY PRN
Qty: 30 TAB | Refills: 3 | Status: SHIPPED | OUTPATIENT
Start: 2020-12-04

## 2020-12-04 NOTE — PROGRESS NOTES
Subjective:   Josie Prajapati is a 25 y.o. female here today for PAP, breast pain, med refill    Screening for malignant neoplasm of cervix  She presents today for Pap.  Believes her last was done 3 to 4 years ago.  States that it was not normal but she is unsure of the results.  She currently reports vaginal discharge as discussed above.  Her menstrual cycles are irregular which she attributes to her Nexplanon and they are sometimes quite heavy.  She denies pelvic pain outside of cramping associated with her menses.  She is currently sexually active with one male partner.    Vaginal discharge  She complains of persistent vaginal discharge that has been present for years.  She states that it never quite clears up.  In the past, reports numerous bacterial vaginosis infections for which she had to take antibiotics.  She denies fevers, chills, pelvic pain.    Anxiety  She has intermittent panic attacks.  She has been using hydroxyzine as needed and reports that it is very helpful.  She is requesting a refill of this medication today.    Breast pain, right  She reports pain in the right breast over the upper inner quadrant that started last week.  It only lasted for 1 to 2 days and it has completely resolved.  She has not felt any lumps or bumps.  She is not currently breast-feeding.  She denies any overlying skin changes or nipple discharge.       Current medicines (including changes today)  Current Outpatient Medications   Medication Sig Dispense Refill   • hydrOXYzine HCl (ATARAX) 25 MG Tab Take 1 Tab by mouth 3 times a day as needed for Anxiety. 30 Tab 3   • fluticasone (FLONASE) 50 MCG/ACT nasal spray Spray 1 Spray in nose every day. 16 g 11   • etonogestrel (NEXPLANON) 68 MG Implant implant Inject 68 mg as instructed.       No current facility-administered medications for this visit.      She  has a past medical history of Anxiety.    ROS   Denies chest pain, shortness of breath  As above in HPI      Objective:     Vitals:    12/04/20 0826   BP: 100/70   Pulse: 74   Resp: 16   Temp: 36.5 °C (97.7 °F)   SpO2: 97%     Body mass index is 25.9 kg/m².   Physical Exam:  Constitutional: Alert, no distress.  Skin: Warm, dry, good turgor, no rashes in visible areas.  Eye: Equal, round and reactive, conjunctiva clear, lids normal.  Breast: Right breast without masses, normal in appearance  : external genitalia normal, cervix without discharge or lesions, no cervical motion tenderness    Assessment and Plan:   The following treatment plan was discussed    1. Anxiety  Stable, well-controlled with as needed hydroxyzine which was refilled today  - hydrOXYzine HCl (ATARAX) 25 MG Tab; Take 1 Tab by mouth 3 times a day as needed for Anxiety.  Dispense: 30 Tab; Refill: 3    2. Vaginal discharge  Suspect her discharge is physiologic however will evaluate for BV  - VAGINAL PATHOGENS DNA PANEL; Future    3. Screening for malignant neoplasm of cervix  - THINPREP RFLX HPV ASCUS W/CTNG; Future    4. Right breast pain  With normal exam and resolution of symptoms.  Discussed continued monitoring.  If she does feel any underlying lumps or bumps or the pain recurs, would consider imaging.    Followup: Return in about 1 year (around 12/4/2021), or if symptoms worsen or fail to improve, for annual.

## 2020-12-04 NOTE — ASSESSMENT & PLAN NOTE
She complains of persistent vaginal discharge that has been present for years.  She states that it never quite clears up.  In the past, reports numerous bacterial vaginosis infections for which she had to take antibiotics.  She denies fevers, chills, pelvic pain.

## 2020-12-04 NOTE — ASSESSMENT & PLAN NOTE
She reports pain in the right breast over the upper inner quadrant that started last week.  It only lasted for 1 to 2 days and it has completely resolved.  She has not felt any lumps or bumps.  She is not currently breast-feeding.  She denies any overlying skin changes or nipple discharge.

## 2020-12-04 NOTE — ASSESSMENT & PLAN NOTE
She presents today for Pap.  Believes her last was done 3 to 4 years ago.  States that it was not normal but she is unsure of the results.  She currently reports vaginal discharge as discussed above.  Her menstrual cycles are irregular which she attributes to her Nexplanon and they are sometimes quite heavy.  She denies pelvic pain outside of cramping associated with her menses.  She is currently sexually active with one male partner.

## 2020-12-04 NOTE — ASSESSMENT & PLAN NOTE
She has intermittent panic attacks.  She has been using hydroxyzine as needed and reports that it is very helpful.  She is requesting a refill of this medication today.

## 2020-12-05 ENCOUNTER — HOSPITAL ENCOUNTER (OUTPATIENT)
Facility: MEDICAL CENTER | Age: 25
End: 2020-12-05
Attending: INTERNAL MEDICINE
Payer: MEDICAID

## 2020-12-07 DIAGNOSIS — Z12.4 SCREENING FOR MALIGNANT NEOPLASM OF CERVIX: ICD-10-CM

## 2020-12-07 DIAGNOSIS — N89.8 VAGINAL DISCHARGE: ICD-10-CM

## 2020-12-07 LAB
FORWARD REASON: SPWHY: NORMAL
FORWARDED TO LAB: SPWHR: NORMAL
SPECIMEN SENT (2ND): SPWT2: NORMAL
SPECIMEN SENT: SPWT1: NORMAL

## 2021-01-05 ENCOUNTER — OFFICE VISIT (OUTPATIENT)
Dept: MEDICAL GROUP | Facility: MEDICAL CENTER | Age: 26
End: 2021-01-05
Attending: FAMILY MEDICINE
Payer: MEDICAID

## 2021-01-05 VITALS
DIASTOLIC BLOOD PRESSURE: 58 MMHG | TEMPERATURE: 97.9 F | HEIGHT: 61 IN | SYSTOLIC BLOOD PRESSURE: 104 MMHG | OXYGEN SATURATION: 97 % | HEART RATE: 76 BPM | RESPIRATION RATE: 16 BRPM | BODY MASS INDEX: 26.06 KG/M2 | WEIGHT: 138 LBS

## 2021-01-05 DIAGNOSIS — S63.502A SPRAIN OF LEFT WRIST, INITIAL ENCOUNTER: ICD-10-CM

## 2021-01-05 PROCEDURE — 99213 OFFICE O/P EST LOW 20 MIN: CPT | Performed by: FAMILY MEDICINE

## 2021-01-05 RX ORDER — NAPROXEN 500 MG/1
500 TABLET ORAL 2 TIMES DAILY WITH MEALS
Qty: 40 TAB | Refills: 0 | Status: SHIPPED | OUTPATIENT
Start: 2021-01-05 | End: 2021-06-07

## 2021-01-06 NOTE — PROGRESS NOTES
"Subjective:      Josiegeraldine Prajapati is a 25 y.o. female who presents with Wrist Pain (rt wrist and arm pain, edema last pm )            HPI 1.  Left wrist sprain-patient reports that she by herself was moving some couches at home 3 days ago in order to clean underneath them.  She felt a pop but no immediate pain in her left wrist during that activity.  Over several hours she started develop pain in her left wrist by that evening.  She had pain and swelling yesterday.  Took a single 200 mg dose of ibuprofen which was significantly helpful.  Today she has not noticed the swelling but notices continued pain on the ulnar aspect of her left wrist.  She denies any past history of similar condition.    ROS negative for focal numbness, diffuse joint pain, chest pain, altered gait       Objective:     /58 (BP Location: Right arm, Patient Position: Sitting, BP Cuff Size: Adult)   Pulse 76   Temp 36.6 °C (97.9 °F) (Temporal)   Resp 16   Ht 1.549 m (5' 0.98\")   Wt 62.6 kg (138 lb)   SpO2 97%   BMI 26.09 kg/m²      Physical Exam  General-alert cooperative female in no acute distress  Left wrist-point tenderness over the volar aspect of the ulnar styloid without any crepitus or step-off.  Light touch is preserved over the left hand and wrist.  There is no tenderness over the forearm or elbow area  Neck- trachea is midline, thyroid is symmetric and nontender, no cervical adenopathy, supple          Assessment/Plan:        1. Sprain of left wrist, initial encounter    - naproxen (NAPROSYN) 500 MG Tab; Take 1 Tab by mouth 2 times a day, with meals.  Dispense: 40 Tab; Refill: 0  Plan: 1.  Left wrist brace to be worn during activities for the next 10 to 14 days  2.  Naprosyn 500 mg twice daily with food for up to 3 weeks as needed for pain  3.  Patient will contact me if not improving within 2 weeks-consider x-ray at that time  "

## 2021-06-07 ENCOUNTER — HOSPITAL ENCOUNTER (EMERGENCY)
Facility: MEDICAL CENTER | Age: 26
End: 2021-06-07
Attending: EMERGENCY MEDICINE
Payer: MEDICAID

## 2021-06-07 ENCOUNTER — HOSPITAL ENCOUNTER (EMERGENCY)
Facility: MEDICAL CENTER | Age: 26
End: 2021-06-07
Payer: MEDICAID

## 2021-06-07 VITALS
TEMPERATURE: 99.1 F | HEART RATE: 101 BPM | WEIGHT: 140.87 LBS | DIASTOLIC BLOOD PRESSURE: 97 MMHG | HEIGHT: 60 IN | RESPIRATION RATE: 18 BRPM | SYSTOLIC BLOOD PRESSURE: 132 MMHG | BODY MASS INDEX: 27.66 KG/M2 | OXYGEN SATURATION: 99 %

## 2021-06-07 VITALS
OXYGEN SATURATION: 97 % | RESPIRATION RATE: 18 BRPM | HEART RATE: 75 BPM | DIASTOLIC BLOOD PRESSURE: 75 MMHG | TEMPERATURE: 99 F | SYSTOLIC BLOOD PRESSURE: 122 MMHG

## 2021-06-07 VITALS
TEMPERATURE: 97 F | OXYGEN SATURATION: 98 % | RESPIRATION RATE: 16 BRPM | HEIGHT: 60 IN | SYSTOLIC BLOOD PRESSURE: 125 MMHG | WEIGHT: 140.87 LBS | HEART RATE: 87 BPM | DIASTOLIC BLOOD PRESSURE: 81 MMHG | BODY MASS INDEX: 27.66 KG/M2

## 2021-06-07 DIAGNOSIS — L30.9 DERMATITIS: ICD-10-CM

## 2021-06-07 DIAGNOSIS — T78.40XA ALLERGIC REACTION, INITIAL ENCOUNTER: ICD-10-CM

## 2021-06-07 DIAGNOSIS — L50.9 URTICARIA: ICD-10-CM

## 2021-06-07 PROCEDURE — 302449 STATCHG TRIAGE ONLY (STATISTIC)

## 2021-06-07 PROCEDURE — A9270 NON-COVERED ITEM OR SERVICE: HCPCS | Performed by: EMERGENCY MEDICINE

## 2021-06-07 PROCEDURE — 99283 EMERGENCY DEPT VISIT LOW MDM: CPT

## 2021-06-07 PROCEDURE — 700102 HCHG RX REV CODE 250 W/ 637 OVERRIDE(OP): Performed by: EMERGENCY MEDICINE

## 2021-06-07 PROCEDURE — 99282 EMERGENCY DEPT VISIT SF MDM: CPT

## 2021-06-07 PROCEDURE — 700111 HCHG RX REV CODE 636 W/ 250 OVERRIDE (IP): Performed by: EMERGENCY MEDICINE

## 2021-06-07 RX ORDER — HYDROXYZINE HYDROCHLORIDE 25 MG/1
25 TABLET, FILM COATED ORAL 3 TIMES DAILY PRN
Qty: 30 TABLET | Refills: 0 | Status: SHIPPED | OUTPATIENT
Start: 2021-06-07 | End: 2021-06-16

## 2021-06-07 RX ORDER — CETIRIZINE HYDROCHLORIDE 10 MG/1
10 TABLET ORAL ONCE
Status: COMPLETED | OUTPATIENT
Start: 2021-06-07 | End: 2021-06-07

## 2021-06-07 RX ORDER — HYDROXYZINE HYDROCHLORIDE 25 MG/1
25 TABLET, FILM COATED ORAL ONCE
Status: COMPLETED | OUTPATIENT
Start: 2021-06-07 | End: 2021-06-07

## 2021-06-07 RX ORDER — PREDNISONE 10 MG/1
TABLET ORAL
Qty: 20 TABLET | Refills: 0 | Status: SHIPPED | OUTPATIENT
Start: 2021-06-07 | End: 2021-06-16

## 2021-06-07 RX ORDER — HYDROCORTISONE VALERATE CREAM 2 MG/G
1 CREAM TOPICAL 2 TIMES DAILY PRN
Qty: 30 G | Refills: 1 | Status: SHIPPED | OUTPATIENT
Start: 2021-06-07 | End: 2021-06-16

## 2021-06-07 RX ORDER — FAMOTIDINE 20 MG/1
20 TABLET, FILM COATED ORAL ONCE
Status: COMPLETED | OUTPATIENT
Start: 2021-06-07 | End: 2021-06-07

## 2021-06-07 RX ADMIN — PREDNISONE 60 MG: 50 TABLET ORAL at 21:45

## 2021-06-07 RX ADMIN — CETIRIZINE HYDROCHLORIDE 10 MG: 10 TABLET, FILM COATED ORAL at 21:40

## 2021-06-07 RX ADMIN — HYDROXYZINE HYDROCHLORIDE 25 MG: 25 TABLET, FILM COATED ORAL at 21:44

## 2021-06-07 RX ADMIN — FAMOTIDINE 20 MG: 20 TABLET ORAL at 22:41

## 2021-06-07 NOTE — ED TRIAGE NOTES
Josie Carystormy Prajapati  25 y.o. female  Chief Complaint   Patient presents with   • Rash     Patient reports that at 1000 this morning she noticed a rash around her mouth and itchiness in her throat. The rash then has become more severe and spread to her face, back, and chest area. Patient denies any allergies. Patient reports that she did have sushi last night and had been having abdominal pain during the night that has now resolved.     Denies shortness of breath and denies throat swelling. Airway intact.     Vitals:    06/07/21 1255   BP: 132/97   Pulse: (!) 101   Resp: 18   Temp: 37.3 °C (99.1 °F)   SpO2: 99%

## 2021-06-07 NOTE — ED PROVIDER NOTES
ED Provider Note    CHIEF COMPLAINT  Chief Complaint   Patient presents with   • Rash     woke w/ rash around mouth, neck, chest and back this am       [facility]  Josie Prajapati is a 25 y.o. female who presents complaining of itchy rash around her mouth/face, upper shoulders, upper chest, and upper back.  Patient awoke with this this morning.  She denies any new exposures, shortness of breath, new creams/beauty products, detergents or medications, sick contacts, fever, chills.      ALLERGIES  No Known Allergies    CURRENT MEDICATIONS  Antianxiety medication    PAST MEDICAL HISTORY   has a past medical history of Anxiety.    SURGICAL HISTORY  patient denies any surgical history    SOCIAL HISTORY  Social History     Tobacco Use   • Smoking status: Never Smoker   • Smokeless tobacco: Never Used   Substance and Sexual Activity   • Alcohol use: Yes     Alcohol/week: 1.8 oz     Types: 3 Cans of beer per week   • Drug use: No   • Sexual activity: Yes     Partners: Male     Birth control/protection: Implant       REVIEW OF SYSTEMS  Patient admits to itchy rash on her face and upper back   pt denies new exposures, sick contacts, fever, chills, nausea, vomiting, difficulty breathing  See HPI for further details.       PHYSICAL EXAM  VITAL SIGNS: /96   Pulse 86   Temp 36.3 °C (97.3 °F) (Temporal)   Resp 15   Ht 1.524 m (5')   Wt 63.9 kg (140 lb 14 oz)   LMP 05/07/2021   SpO2 99%   BMI 27.51 kg/m²     General:  WDWN female, nontoxic appearing in NAD; A+Ox3; V/S as above; afebrile  Skin: warm and dry; good color; scattered erythematous rash in the pattern of a razor back sports bra over the patient's shoulders and upper back  HEENT: NCAT; acneiform rash; well-demarcated confluent erythema over the nose bilateral cheeks chin ending at the angle of the jaw; EOMs intact; PERRL; no scleral icterus; oropharynx clear  Neck: FROM; soft  Cardiovascular: Regular heart rate and rhythm.  No murmurs, rubs, or  gallops  Lungs: Clear to auscultation with good air movement bilaterally.  No wheezes, rhonchi, or rales.   Extremities: MACKEY x 4; no e/o trauma; no pedal edema  Neurologic: CNs III-XII grossly intact; speech clear; distal sensation intact; strength 5/5 UE/LEs  Psychiatric: Appropriate affect, normal mood    MEDICAL RECORD  I have reviewed the patient's medical record and pertinent results as listed.      COURSE & MEDICAL DECISION MAKING  I have reviewed any medical record information, laboratory studies and radiographic results as noted.    Appropriate PPE was worn at all times while interacting with the patient, including goggles, N95 mask, and surgical mask.    Josie Prajapati is a 25 y.o. female who presents complaining of itchy rash which appears to be a reaction to something.  Patient denies using new masks or applying any cosmetics.  No new detergents are reported.  I strongly suspected this given the fact that the erythema on the face is in the pattern of a mask ending at the angle of the jaw.  I do not suspect lupus.  The rash is also located over her shoulders where her sports bra, which she slept in, is.  Advised the patient that I do not have testing to determine what caused her allergic reaction.  I have advised triamcinolone to the affected areas, to be used sparingly on the face to avoid scarring, and antihistamine such as Benadryl or Zyrtec.  She should also apply cool compresses.  Return precautions discussed.      FINAL IMPRESSION  1. Dermatitis     2. Allergic reaction, initial encounter         Electronically signed by: Evelyn Baig M.D., 6/7/2021 2:30 PM

## 2021-06-07 NOTE — DISCHARGE INSTRUCTIONS
Take Benadryl and or Zyrtec as needed for itching    Apply steroid cream sparingly to your face and avoid the sun    Return to the ER for difficulty breathing, fever, or other concerns

## 2021-06-07 NOTE — ED TRIAGE NOTES
Chief Complaint   Patient presents with   • Rash     woke w/ rash around mouth, neck, chest and back this am     /96   Pulse 86   Temp 36.3 °C (97.3 °F) (Temporal)   Resp 15   Ht 1.524 m (5')   Wt 63.9 kg (140 lb 14 oz)   LMP 05/07/2021   SpO2 99%   BMI 27.51 kg/m²     Pt ambulated to ED w/ friend for c/o rash first noticed this am.  Pt denies any known allergens, denies difficulty swallowing or breathing.

## 2021-06-08 NOTE — ED PROVIDER NOTES
ED Provider Note    CHIEF COMPLAINT  Chief Complaint   Patient presents with   • Rash     Pt was here this am for the same but rash has gotten worse spreading to both arm, face, chest, back. Rash is itching. Pt took benadryl at home with no relief. Denies new products or being outdoors during the past few days.        HPI  Josie Prajapati is a 25 y.o. female who presents complaining of worsening, pruritic rash over the face and trunk.    Patient denies history of anaphylaxis, asthma, difficulty breathing, tongue swelling or wheezing.  As before, she continues to deny any new exposures.  She denies fever.  Patient applied the Westcort cream that I previously prescribed without relief.      ALLERGIES  No Known Allergies    CURRENT MEDICATIONS  Home Medications     Reviewed by Donavan Mace R.N. (Registered Nurse) on 06/07/21 at 2049  Med List Status: Partial   Medication Last Dose Status   etonogestrel (NEXPLANON) 68 MG Implant implant  Active   fluticasone (FLONASE) 50 MCG/ACT nasal spray  Active   hydrocortisone (WESTCORT) 0.2 % cream 6/7/2021 Active   hydrOXYzine HCl (ATARAX) 25 MG Tab  Active   naproxen (NAPROSYN) 500 MG Tab  Active                PAST MEDICAL HISTORY   has a past medical history of Anxiety.    SURGICAL HISTORY  patient denies any surgical history    SOCIAL HISTORY  Social History     Tobacco Use   • Smoking status: Never Smoker   • Smokeless tobacco: Never Used   Substance and Sexual Activity   • Alcohol use: Yes     Alcohol/week: 1.8 oz     Types: 3 Cans of beer per week   • Drug use: No   • Sexual activity: Yes     Partners: Male     Birth control/protection: Implant       Family Hx:  Denies food allergies      REVIEW OF SYSTEMS  Patient admits to itchy rash over the face and trunk   pt denies new exposures, difficulty breathing, nausea, vomiting, abdominal pain, syncope  See HPI for further details.       PHYSICAL EXAM  VITAL SIGNS: /99   Pulse 86   Temp 37.2 °C (99 °F)  (Temporal)   Resp 19   LMP 05/11/2021   SpO2 99%     General:  WDWN female, nontoxic appearing in NAD; A+Ox3; V/S as above  Skin: warm and dry; good color; erythematous, blanching rash over the face and scattered around the trunk with urticaria  HEENT: NCAT; EOMs intact; PERRL; no scleral icterus; oropharynx clear  Neck: FROM; no stridor  Cardiovascular: Regular heart rate and rhythm.  No murmurs, rubs, or gallops  Lungs: Clear to auscultation with good air movement bilaterally.  No wheezes, rhonchi, or rales.   Extremities: MACKEY x 4; no e/o trauma; no pedal edema  Neurologic: CNs III-XII grossly intact; speech clear; distal sensation intact; strength 5/5 UE/LEs; gait steady  Psychiatric: Appropriate affect, normal mood    MEDICAL RECORD  I have reviewed the patient's medical record and pertinent results as listed.      COURSE & MEDICAL DECISION MAKING  I have reviewed any medical record information, laboratory studies and radiographic results as noted.    Appropriate PPE was worn at all times while interacting with the patient, including goggles, N95 mask, and surgical mask.    Josie Prajapati is a 25 y.o. female who presents complaining of pruritic rash.  Patient was here earlier in the rash has worsened.  There is no airway involvement.  No new exposures are reported.  No associated fever.    Pt re-evaluated 10:28 PM  Patient continues to feel itching.  Rash has not progressed since she has been here.  She is not hypoxic.  I do not suspect anaphylaxis.  I encouraged the patient to return for any difficulty breathing or worsening symptoms.  I will add Atarax and a tapering dose of prednisone to her regimen.  Advised her to discontinue Westcort cream and not take oral and topical steroids at the same time.  She was referred for recheck to her PCP.    FINAL IMPRESSION  1. Urticaria       Electronically signed by: Evelyn Baig M.D., 6/7/2021 9:29 PM

## 2021-06-08 NOTE — DISCHARGE INSTRUCTIONS
Take prednisone as directed and until done    Take Zyrtec daily    Take Pepcid daily    Take Atarax as needed for itching    Return to the ER for difficulty breathing, wheezing, vomiting, abdominal pain, or other concerns

## 2021-06-08 NOTE — ED NOTES
Pt was seen here earlier by MD  Noted rash is spreading per pt  Face, neck, down chest  Rash to back and now upper arms   Per pt eyes are getting swollen as well

## 2021-06-16 ENCOUNTER — HOSPITAL ENCOUNTER (OUTPATIENT)
Dept: RADIOLOGY | Facility: MEDICAL CENTER | Age: 26
End: 2021-06-16
Attending: INTERNAL MEDICINE
Payer: MEDICAID

## 2021-06-16 ENCOUNTER — OFFICE VISIT (OUTPATIENT)
Dept: MEDICAL GROUP | Facility: MEDICAL CENTER | Age: 26
End: 2021-06-16
Attending: INTERNAL MEDICINE
Payer: MEDICAID

## 2021-06-16 VITALS
OXYGEN SATURATION: 97 % | DIASTOLIC BLOOD PRESSURE: 78 MMHG | HEART RATE: 98 BPM | RESPIRATION RATE: 16 BRPM | SYSTOLIC BLOOD PRESSURE: 110 MMHG | BODY MASS INDEX: 26.81 KG/M2 | WEIGHT: 142 LBS | TEMPERATURE: 97.9 F | HEIGHT: 61 IN

## 2021-06-16 DIAGNOSIS — M25.532 LEFT WRIST PAIN: ICD-10-CM

## 2021-06-16 DIAGNOSIS — R21 RASH: ICD-10-CM

## 2021-06-16 PROBLEM — Z12.4 SCREENING FOR MALIGNANT NEOPLASM OF CERVIX: Status: RESOLVED | Noted: 2020-12-04 | Resolved: 2021-06-16

## 2021-06-16 PROBLEM — N89.8 VAGINAL DISCHARGE: Status: RESOLVED | Noted: 2020-12-04 | Resolved: 2021-06-16

## 2021-06-16 PROBLEM — N64.4 BREAST PAIN, RIGHT: Status: RESOLVED | Noted: 2020-12-04 | Resolved: 2021-06-16

## 2021-06-16 PROCEDURE — 99212 OFFICE O/P EST SF 10 MIN: CPT | Performed by: INTERNAL MEDICINE

## 2021-06-16 PROCEDURE — 73110 X-RAY EXAM OF WRIST: CPT | Mod: LT

## 2021-06-16 PROCEDURE — 99214 OFFICE O/P EST MOD 30 MIN: CPT | Performed by: INTERNAL MEDICINE

## 2021-06-16 NOTE — ASSESSMENT & PLAN NOTE
She was seen in the ER approximately 1 week ago for a rash that developed all over her body.  She states that it was very itchy and she ended up having some swelling in her face and eyelids.  She went to the ER twice for this.  Initially she was given hydroxyzine and hydrocortisone when she returned they also put her on a prednisone taper.  She finished her prednisone on Monday and she reports the rash is completely resolved.  She denies any itching.  She still does not know what caused it.  No one in the family had similar reaction.

## 2021-06-16 NOTE — PROGRESS NOTES
Subjective:   Josie Prajapati is a 25 y.o. female here today for     Left wrist pain  She reports initially injuring her wrist in January when she was moving a heavy couch.  She presented to clinic for follow-up and was seen by Dr. Ferris.  At that time, she was given a wrist brace and it was thought to be a sprain.  He requested that she follow-up in 2 weeks if pain did not improve but she has not followed up until now.  She states that the pain does fluctuate.  The more she does with her wrist the worst it hurts.  She notices a lump on the dorsal aspect of the wrist that is very tender and she cannot flex or extend the wrist without pain.  She also reports weakness in the hand, inability to open bottles and containers, dropping things easily, and more difficulty doing her job as a  due to the pain.  She also has pain when she tries to  her son.  She has not had any additional injuries to the wrist.  She does take ibuprofen when the pain is severe and reports that this helps.  She also puts ice on it when it swells.  She has pain when making a fist.  To date she has not had any imaging of the wrist.    Rash  She was seen in the ER approximately 1 week ago for a rash that developed all over her body.  She states that it was very itchy and she ended up having some swelling in her face and eyelids.  She went to the ER twice for this.  Initially she was given hydroxyzine and hydrocortisone when she returned they also put her on a prednisone taper.  She finished her prednisone on Monday and she reports the rash is completely resolved.  She denies any itching.  She still does not know what caused it.  No one in the family had similar reaction.         Current medicines (including changes today)  Current Outpatient Medications   Medication Sig Dispense Refill   • hydrOXYzine HCl (ATARAX) 25 MG Tab Take 1 Tab by mouth 3 times a day as needed for Anxiety. 30 Tab 3   • fluticasone (FLONASE) 50  MCG/ACT nasal spray Spray 1 Spray in nose every day. 16 g 11   • etonogestrel (NEXPLANON) 68 MG Implant implant Inject 68 mg as instructed.       No current facility-administered medications for this visit.     She  has a past medical history of Anxiety.    ROS   Denies chest pain, shortness of breath  As above in HPI     Objective:     Vitals:    06/16/21 0841   BP: 110/78   Pulse: 98   Resp: 16   Temp: 36.6 °C (97.9 °F)   SpO2: 97%     Body mass index is 26.84 kg/m².   Physical Exam:  Constitutional: Alert, no distress.  Skin: Warm, dry, good turgor, no rashes in visible areas.  Eye: Equal, round and reactive, conjunctiva clear, lids normal.  Psych: Alert and oriented x3, normal affect and mood.  MSK: Tender to palpation over dorsal aspect of wrist with some swelling, question ganglion cyst versus effusion, unable to flex or extend wrist due to pain, is able to make a fist but with pain, no increased warmth or erythema, full range of motion of fingers      Assessment and Plan:   The following treatment plan was discussed    1. Left wrist pain  With noticeable swelling and reduced range of motion on exam.  This is chronic and I suspect that she did injure the wrist back in January.  Will obtain initial imaging with x-ray to evaluate for an occult fracture.  If negative would consider an MRI for further evaluation given the swelling, tenderness, and very significant reduction in range of motion that has been present for 6 months  - DX-WRIST-COMPLETE 3+ LEFT; Future    2. Rash  Unclear etiology.  Has completely resolved with prednisone.  We will continue to monitor.        Followup: Return if symptoms worsen or fail to improve.

## 2021-06-17 DIAGNOSIS — M87.039 AVASCULAR NECROSIS OF LUNATE (HCC): ICD-10-CM

## 2021-06-25 ENCOUNTER — PATIENT MESSAGE (OUTPATIENT)
Dept: MEDICAL GROUP | Facility: MEDICAL CENTER | Age: 26
End: 2021-06-25

## 2021-06-25 DIAGNOSIS — N93.8 DUB (DYSFUNCTIONAL UTERINE BLEEDING): ICD-10-CM

## 2021-06-25 RX ORDER — MEDROXYPROGESTERONE ACETATE 10 MG/1
10 TABLET ORAL DAILY
Qty: 10 TABLET | Refills: 0 | Status: SHIPPED | OUTPATIENT
Start: 2021-06-25 | End: 2021-07-05

## 2021-06-26 NOTE — TELEPHONE ENCOUNTER
From: Josie Prajapati  To: Physician Michelle Smith  Sent: 6/25/2021 1:30 PM PDT  Subject: Non-Urgent Medical Question    I change my pad more then 7 times a day it's heavy my back hurts and my right side of the stomach       ----- Message -----   From:Physician Michelle Smith   Sent:6/25/2021 1:18 PM PDT   To:Josie Prajapati   Subject:RE: Non-Urgent Medical Question    Hi Josie  Sometimes irregular bleeding like this can be related to the Nexplanon birth control. Do still have this? We get worried about vaginal bleeding if you are soaking through a pad or tampon every hour for more than a day or two. If that is the case, would recommend going to the emergency room for further evaluation. If you are not quite bleeding that heavily, we can sometimes start you on a medication called Provera which will stop the bleeding. Let me know.  Michelle Smith M.D.      ----- Message -----   From:Josie Prajapati   Sent:6/25/2021 11:06 AM PDT   To:Physician Michelle Smith   Subject:Non-Urgent Medical Question    Hey good morning,   Quick question so I was in my period from June 12 to the 19 then I started again on Monday but this time every movement I make it comes down so much I have to change my pad a lot during the day and I feel weak

## 2021-07-20 ENCOUNTER — APPOINTMENT (OUTPATIENT)
Dept: RADIOLOGY | Facility: MEDICAL CENTER | Age: 26
End: 2021-07-20
Attending: PHYSICIAN ASSISTANT
Payer: MEDICAID

## 2021-07-20 DIAGNOSIS — M65.822 OTHER SYNOVITIS AND TENOSYNOVITIS, LEFT UPPER ARM: ICD-10-CM

## 2021-07-20 DIAGNOSIS — M25.532 LEFT WRIST PAIN: ICD-10-CM

## 2021-07-20 PROCEDURE — 73221 MRI JOINT UPR EXTREM W/O DYE: CPT | Mod: LT

## 2021-08-11 PROBLEM — M25.532 LEFT WRIST PAIN: Status: ACTIVE | Noted: 2021-08-11

## 2021-08-11 PROBLEM — M93.1: Status: ACTIVE | Noted: 2021-08-11

## 2021-10-06 ENCOUNTER — OFFICE VISIT (OUTPATIENT)
Dept: MEDICAL GROUP | Facility: MEDICAL CENTER | Age: 26
End: 2021-10-06
Attending: INTERNAL MEDICINE
Payer: MEDICAID

## 2021-10-06 VITALS
TEMPERATURE: 97.9 F | DIASTOLIC BLOOD PRESSURE: 70 MMHG | RESPIRATION RATE: 16 BRPM | SYSTOLIC BLOOD PRESSURE: 100 MMHG | OXYGEN SATURATION: 97 % | WEIGHT: 141 LBS | BODY MASS INDEX: 26.62 KG/M2 | HEART RATE: 72 BPM | HEIGHT: 61 IN

## 2021-10-06 DIAGNOSIS — Z97.5 NEXPLANON IN PLACE: ICD-10-CM

## 2021-10-06 DIAGNOSIS — Z30.011 INITIATION OF OCP (BCP): ICD-10-CM

## 2021-10-06 DIAGNOSIS — K52.9 CHRONIC DIARRHEA: ICD-10-CM

## 2021-10-06 PROBLEM — M25.532 LEFT WRIST PAIN: Status: RESOLVED | Noted: 2021-06-16 | Resolved: 2021-10-06

## 2021-10-06 PROBLEM — R21 RASH: Status: RESOLVED | Noted: 2021-06-16 | Resolved: 2021-10-06

## 2021-10-06 PROCEDURE — 99214 OFFICE O/P EST MOD 30 MIN: CPT | Mod: 25 | Performed by: INTERNAL MEDICINE

## 2021-10-06 PROCEDURE — 11982 REMOVE DRUG IMPLANT DEVICE: CPT | Performed by: INTERNAL MEDICINE

## 2021-10-06 PROCEDURE — 99212 OFFICE O/P EST SF 10 MIN: CPT | Performed by: INTERNAL MEDICINE

## 2021-10-06 PROCEDURE — 700101 HCHG RX REV CODE 250: Performed by: INTERNAL MEDICINE

## 2021-10-06 RX ORDER — LOPERAMIDE HYDROCHLORIDE 2 MG/1
2 CAPSULE ORAL 4 TIMES DAILY PRN
Qty: 30 CAPSULE | Refills: 0 | Status: SHIPPED | OUTPATIENT
Start: 2021-10-06 | End: 2021-12-06 | Stop reason: SDUPTHER

## 2021-10-06 RX ORDER — NORETHINDRONE ACETATE AND ETHINYL ESTRADIOL .03; 1.5 MG/1; MG/1
1 TABLET ORAL DAILY
Qty: 28 TABLET | Refills: 5 | Status: SHIPPED | OUTPATIENT
Start: 2021-10-06 | End: 2023-02-16

## 2021-10-06 RX ADMIN — LIDOCAINE HYDROCHLORIDE 10 ML: 10; .005 INJECTION, SOLUTION EPIDURAL; INFILTRATION; INTRACAUDAL; PERINEURAL at 10:54

## 2021-10-06 NOTE — PROGRESS NOTES
Subjective:   Josie Prajapati is a 26 y.o. female here today for nexplanon removal, discuss birth control alternative, diarrhea     Chronic diarrhea  She reports that ever since having her son in 2018, she has had chronic diarrhea.  This limits her activities and she has difficulty going to the store because she is afraid she will have to run to the bathroom.  She reports two episodes of incontinence because she did not reach the bathroom in time however this is not a regular occurrence for her.  She denies low back pain, numbness and tingling in the perineal region.  She denies melena, hematochezia.  She reports mucus with her diarrhea and one nocturnal awakening each night to have diarrhea.  The nocturnal awakenings started over the past few months.  She has never seen GI or had a colonoscopy.  No history of cholecystectomy.      Initiation of OCP (BCP)  She would like to have her Nexplanon removed and started on OCPs.  She reports that her Nexplanon has been in for 3 years and she has started to have some menorrhagia and breakthrough bleeding for the past 2 months.    Nexplanon in place  Placed August 2018, due for removal.  Patient would like it removed today.       Current medicines (including changes today)  Current Outpatient Medications   Medication Sig Dispense Refill   • Norethindrone Acet-Ethinyl Est (LOESTRIN 1.5/30, 21,) 1.5-30 MG-MCG Tab Take 1 Tablet by mouth every day. 28 Tablet 5   • loperamide (IMODIUM) 2 MG Cap Take 1 Capsule by mouth 4 times a day as needed for Diarrhea. 30 Capsule 0   • hydrocortisone (WESTCORT) 0.2 % cream APPLY 1 APPLICATION TOPICALLY 2 TIMES A DAY AS NEEDED (ITCHING).     • hydrOXYzine HCl (ATARAX) 25 MG Tab Take 1 Tab by mouth 3 times a day as needed for Anxiety. 30 Tab 3   • fluticasone (FLONASE) 50 MCG/ACT nasal spray Spray 1 Spray in nose every day. 16 g 11     No current facility-administered medications for this visit.     She  has a past medical history of  Anxiety.    ROS   Denies chest pain, shortness of breath  As above in HPI     Objective:     Vitals:    10/06/21 1007   BP: 100/70   Pulse: 72   Resp: 16   Temp: 36.6 °C (97.9 °F)   SpO2: 97%     Body mass index is 26.66 kg/m².   Physical Exam:  Constitutional: Alert, no distress.  Skin: Warm, dry, good turgor, no rashes in visible areas, nexplanon easily palpable in SQ tissue of left upper arm.  Eye: Equal, round and reactive, conjunctiva clear, lids normal.  Psych: Alert and oriented x3, normal affect and mood.    Procedure note:  Procedure note:   Risk and benefit of Nexplanon removal procedure was explained to patient and she agreed to proceed.  The Nexplanon was first palpated and was noted to be superficial in the skin.  The area over the Nexplanon was then prepped with alcohol.  Approximately 1.5 cc of 1% Xylocaine with epinephrine was injected subcutaneously beneath the implant to achieve anesthesia.  Approximately 2 mm incision was made at the distal end of the Nexplanon.  It was then dissected from the scar tissue and slowly worked out the incision.  It was grasped with forceps and removed.  Patient tolerated procedure well without complication.  The area was then dressed with 3 Steri-Strips and a Band-Aid.  She was given aftercare instructions.    Assessment and Plan:   The following treatment plan was discussed    1. Initiation of OCP (BCP)  Instructed her to start OCPs today and use a backup method for the first week now that her Nexplanon is removed  - Norethindrone Acet-Ethinyl Est (LOESTRIN 1.5/30, 21,) 1.5-30 MG-MCG Tab; Take 1 Tablet by mouth every day.  Dispense: 28 Tablet; Refill: 5    2. Chronic diarrhea  Differential includes IBS, IBD, secretory diarrhea, unlikely infectious etiology given chronicity.  She has never had a colonoscopy to rule out microscopic colitis or other inflammatory bowel process.  Referral has been placed to GI for further work-up.  Trial of Imodium given.  - REFERRAL TO  GASTROENTEROLOGY  - loperamide (IMODIUM) 2 MG Cap; Take 1 Capsule by mouth 4 times a day as needed for Diarrhea.  Dispense: 30 Capsule; Refill: 0    3. Nexplanon in place  Removed today, see procedure note.        Followup: Return if symptoms worsen or fail to improve.

## 2021-10-06 NOTE — ASSESSMENT & PLAN NOTE
She reports that ever since having her son in 2018, she has had chronic diarrhea.  This limits her activities and she has difficulty going to the store because she is afraid she will have to run to the bathroom.  She reports two episodes of incontinence because she did not reach the bathroom in time however this is not a regular occurrence for her.  She denies low back pain, numbness and tingling in the perineal region.  She denies melena, hematochezia.  She reports mucus with her diarrhea and one nocturnal awakening each night to have diarrhea.  The nocturnal awakenings started over the past few months.  She has never seen GI or had a colonoscopy.  No history of cholecystectomy.

## 2021-10-06 NOTE — ASSESSMENT & PLAN NOTE
She would like to have her Nexplanon removed and started on OCPs.  She reports that her Nexplanon has been in for 3 years and she has started to have some menorrhagia and breakthrough bleeding for the past 2 months.   verbal instruction

## 2021-12-06 DIAGNOSIS — K52.9 CHRONIC DIARRHEA: ICD-10-CM

## 2021-12-06 RX ORDER — LOPERAMIDE HYDROCHLORIDE 2 MG/1
2 CAPSULE ORAL 4 TIMES DAILY PRN
Qty: 30 CAPSULE | Refills: 0 | Status: SHIPPED | OUTPATIENT
Start: 2021-12-06 | End: 2022-11-16 | Stop reason: SDUPTHER

## 2021-12-27 ENCOUNTER — HOSPITAL ENCOUNTER (EMERGENCY)
Facility: MEDICAL CENTER | Age: 26
End: 2021-12-27
Attending: EMERGENCY MEDICINE
Payer: MEDICAID

## 2021-12-27 ENCOUNTER — APPOINTMENT (OUTPATIENT)
Dept: RADIOLOGY | Facility: MEDICAL CENTER | Age: 26
End: 2021-12-27
Attending: EMERGENCY MEDICINE
Payer: MEDICAID

## 2021-12-27 VITALS
TEMPERATURE: 98 F | BODY MASS INDEX: 26.66 KG/M2 | OXYGEN SATURATION: 99 % | WEIGHT: 135.8 LBS | HEIGHT: 60 IN | RESPIRATION RATE: 20 BRPM | HEART RATE: 79 BPM | DIASTOLIC BLOOD PRESSURE: 65 MMHG | SYSTOLIC BLOOD PRESSURE: 107 MMHG

## 2021-12-27 DIAGNOSIS — E86.0 DEHYDRATION: ICD-10-CM

## 2021-12-27 DIAGNOSIS — R11.2 NON-INTRACTABLE VOMITING WITH NAUSEA, UNSPECIFIED VOMITING TYPE: ICD-10-CM

## 2021-12-27 DIAGNOSIS — N10 ACUTE PYELONEPHRITIS: ICD-10-CM

## 2021-12-27 LAB
ALBUMIN SERPL BCP-MCNC: 4.6 G/DL (ref 3.2–4.9)
ALBUMIN/GLOB SERPL: 1.2 G/DL
ALP SERPL-CCNC: 93 U/L (ref 30–99)
ALT SERPL-CCNC: 22 U/L (ref 2–50)
ANION GAP SERPL CALC-SCNC: 14 MMOL/L (ref 7–16)
APPEARANCE UR: CLEAR
AST SERPL-CCNC: 19 U/L (ref 12–45)
BACTERIA #/AREA URNS HPF: ABNORMAL /HPF
BASOPHILS # BLD AUTO: 0.5 % (ref 0–1.8)
BASOPHILS # BLD: 0.06 K/UL (ref 0–0.12)
BILIRUB SERPL-MCNC: 0.7 MG/DL (ref 0.1–1.5)
BILIRUB UR QL STRIP.AUTO: NEGATIVE
BUN SERPL-MCNC: 27 MG/DL (ref 8–22)
CALCIUM SERPL-MCNC: 9.5 MG/DL (ref 8.4–10.2)
CHLORIDE SERPL-SCNC: 104 MMOL/L (ref 96–112)
CO2 SERPL-SCNC: 22 MMOL/L (ref 20–33)
COLOR UR: YELLOW
CREAT SERPL-MCNC: 1.42 MG/DL (ref 0.5–1.4)
EOSINOPHIL # BLD AUTO: 0.23 K/UL (ref 0–0.51)
EOSINOPHIL NFR BLD: 2.1 % (ref 0–6.9)
EPI CELLS #/AREA URNS HPF: ABNORMAL /HPF
ERYTHROCYTE [DISTWIDTH] IN BLOOD BY AUTOMATED COUNT: 39.8 FL (ref 35.9–50)
GLOBULIN SER CALC-MCNC: 4 G/DL (ref 1.9–3.5)
GLUCOSE SERPL-MCNC: 101 MG/DL (ref 65–99)
GLUCOSE UR STRIP.AUTO-MCNC: NEGATIVE MG/DL
HCG SERPL QL: NEGATIVE
HCT VFR BLD AUTO: 51 % (ref 37–47)
HGB BLD-MCNC: 16.9 G/DL (ref 12–16)
IMM GRANULOCYTES # BLD AUTO: 0.05 K/UL (ref 0–0.11)
IMM GRANULOCYTES NFR BLD AUTO: 0.5 % (ref 0–0.9)
KETONES UR STRIP.AUTO-MCNC: ABNORMAL MG/DL
LACTATE BLD-SCNC: 1.3 MMOL/L (ref 0.5–2)
LEUKOCYTE ESTERASE UR QL STRIP.AUTO: ABNORMAL
LIPASE SERPL-CCNC: 21 U/L (ref 7–58)
LYMPHOCYTES # BLD AUTO: 2.14 K/UL (ref 1–4.8)
LYMPHOCYTES NFR BLD: 19.3 % (ref 22–41)
MCH RBC QN AUTO: 29.9 PG (ref 27–33)
MCHC RBC AUTO-ENTMCNC: 33.1 G/DL (ref 33.6–35)
MCV RBC AUTO: 90.1 FL (ref 81.4–97.8)
MICRO URNS: ABNORMAL
MONOCYTES # BLD AUTO: 0.8 K/UL (ref 0–0.85)
MONOCYTES NFR BLD AUTO: 7.2 % (ref 0–13.4)
MUCOUS THREADS #/AREA URNS HPF: ABNORMAL /HPF
NEUTROPHILS # BLD AUTO: 7.8 K/UL (ref 2–7.15)
NEUTROPHILS NFR BLD: 70.4 % (ref 44–72)
NITRITE UR QL STRIP.AUTO: NEGATIVE
NRBC # BLD AUTO: 0 K/UL
NRBC BLD-RTO: 0 /100 WBC
PH UR STRIP.AUTO: 6 [PH] (ref 5–8)
PLATELET # BLD AUTO: 281 K/UL (ref 164–446)
PMV BLD AUTO: 11.1 FL (ref 9–12.9)
POTASSIUM SERPL-SCNC: 4.1 MMOL/L (ref 3.6–5.5)
PROT SERPL-MCNC: 8.6 G/DL (ref 6–8.2)
PROT UR QL STRIP: 100 MG/DL
RBC # BLD AUTO: 5.66 M/UL (ref 4.2–5.4)
RBC # URNS HPF: ABNORMAL /HPF
RBC UR QL AUTO: ABNORMAL
SODIUM SERPL-SCNC: 140 MMOL/L (ref 135–145)
SP GR UR STRIP.AUTO: 1.02
UNIDENT CRYS URNS QL MICRO: ABNORMAL /HPF
WBC # BLD AUTO: 11.1 K/UL (ref 4.8–10.8)
WBC #/AREA URNS HPF: ABNORMAL /HPF

## 2021-12-27 PROCEDURE — 81001 URINALYSIS AUTO W/SCOPE: CPT

## 2021-12-27 PROCEDURE — 96375 TX/PRO/DX INJ NEW DRUG ADDON: CPT

## 2021-12-27 PROCEDURE — 85025 COMPLETE CBC W/AUTO DIFF WBC: CPT

## 2021-12-27 PROCEDURE — 700111 HCHG RX REV CODE 636 W/ 250 OVERRIDE (IP): Performed by: EMERGENCY MEDICINE

## 2021-12-27 PROCEDURE — 700105 HCHG RX REV CODE 258: Performed by: EMERGENCY MEDICINE

## 2021-12-27 PROCEDURE — 74177 CT ABD & PELVIS W/CONTRAST: CPT

## 2021-12-27 PROCEDURE — 700117 HCHG RX CONTRAST REV CODE 255: Performed by: EMERGENCY MEDICINE

## 2021-12-27 PROCEDURE — 36415 COLL VENOUS BLD VENIPUNCTURE: CPT

## 2021-12-27 PROCEDURE — 84703 CHORIONIC GONADOTROPIN ASSAY: CPT

## 2021-12-27 PROCEDURE — 83690 ASSAY OF LIPASE: CPT

## 2021-12-27 PROCEDURE — 96374 THER/PROPH/DIAG INJ IV PUSH: CPT | Mod: XU

## 2021-12-27 PROCEDURE — 94760 N-INVAS EAR/PLS OXIMETRY 1: CPT

## 2021-12-27 PROCEDURE — 99285 EMERGENCY DEPT VISIT HI MDM: CPT

## 2021-12-27 PROCEDURE — 80053 COMPREHEN METABOLIC PANEL: CPT

## 2021-12-27 PROCEDURE — 83605 ASSAY OF LACTIC ACID: CPT

## 2021-12-27 RX ORDER — CEFTRIAXONE 1 G/1
1 INJECTION, POWDER, FOR SOLUTION INTRAMUSCULAR; INTRAVENOUS ONCE
Status: COMPLETED | OUTPATIENT
Start: 2021-12-27 | End: 2021-12-27

## 2021-12-27 RX ORDER — SODIUM CHLORIDE 9 MG/ML
1000 INJECTION, SOLUTION INTRAVENOUS ONCE
Status: COMPLETED | OUTPATIENT
Start: 2021-12-27 | End: 2021-12-27

## 2021-12-27 RX ORDER — SULFAMETHOXAZOLE AND TRIMETHOPRIM 800; 160 MG/1; MG/1
1 TABLET ORAL 2 TIMES DAILY
Qty: 14 TABLET | Refills: 0 | Status: SHIPPED | OUTPATIENT
Start: 2021-12-27 | End: 2022-01-03

## 2021-12-27 RX ORDER — ONDANSETRON 2 MG/ML
4 INJECTION INTRAMUSCULAR; INTRAVENOUS ONCE
Status: COMPLETED | OUTPATIENT
Start: 2021-12-27 | End: 2021-12-27

## 2021-12-27 RX ORDER — ONDANSETRON 4 MG/1
4 TABLET, ORALLY DISINTEGRATING ORAL EVERY 6 HOURS PRN
Qty: 10 TABLET | Refills: 0 | Status: SHIPPED | OUTPATIENT
Start: 2021-12-27

## 2021-12-27 RX ADMIN — IOHEXOL 100 ML: 350 INJECTION, SOLUTION INTRAVENOUS at 17:51

## 2021-12-27 RX ADMIN — CEFTRIAXONE SODIUM 1 G: 1 INJECTION, POWDER, FOR SOLUTION INTRAMUSCULAR; INTRAVENOUS at 19:09

## 2021-12-27 RX ADMIN — FAMOTIDINE 20 MG: 10 INJECTION INTRAVENOUS at 15:26

## 2021-12-27 RX ADMIN — SODIUM CHLORIDE 1000 ML: 9 INJECTION, SOLUTION INTRAVENOUS at 15:25

## 2021-12-27 RX ADMIN — ONDANSETRON 4 MG: 2 INJECTION INTRAMUSCULAR; INTRAVENOUS at 15:26
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
## 2021-12-27 NOTE — ED NOTES
Patient to room 10 lorenzo into  [facility] chart up for ERP

## 2022-01-04 ENCOUNTER — PATIENT MESSAGE (OUTPATIENT)
Dept: MEDICAL GROUP | Facility: MEDICAL CENTER | Age: 27
End: 2022-01-04

## 2022-01-04 DIAGNOSIS — R79.89 ELEVATED SERUM CREATININE: ICD-10-CM

## 2022-04-28 ENCOUNTER — HOSPITAL ENCOUNTER (OUTPATIENT)
Dept: RADIOLOGY | Facility: MEDICAL CENTER | Age: 27
End: 2022-04-28
Attending: OBSTETRICS & GYNECOLOGY
Payer: MEDICAID

## 2022-04-28 DIAGNOSIS — Z34.82 PRENATAL CARE, SUBSEQUENT PREGNANCY, SECOND TRIMESTER: ICD-10-CM

## 2022-04-28 PROCEDURE — 76805 OB US >/= 14 WKS SNGL FETUS: CPT

## 2022-07-26 ENCOUNTER — APPOINTMENT (OUTPATIENT)
Dept: RADIOLOGY | Facility: MEDICAL CENTER | Age: 27
End: 2022-07-26
Attending: OBSTETRICS & GYNECOLOGY
Payer: MEDICAID

## 2022-07-26 ENCOUNTER — HOSPITAL ENCOUNTER (EMERGENCY)
Facility: MEDICAL CENTER | Age: 27
End: 2022-07-26
Attending: OBSTETRICS & GYNECOLOGY | Admitting: OBSTETRICS & GYNECOLOGY
Payer: MEDICAID

## 2022-07-26 VITALS
HEART RATE: 94 BPM | WEIGHT: 160 LBS | OXYGEN SATURATION: 96 % | BODY MASS INDEX: 31.41 KG/M2 | DIASTOLIC BLOOD PRESSURE: 62 MMHG | SYSTOLIC BLOOD PRESSURE: 110 MMHG | TEMPERATURE: 97.3 F | HEIGHT: 60 IN | RESPIRATION RATE: 18 BRPM

## 2022-07-26 LAB — CRYSTALS AMN MICRO: NORMAL

## 2022-07-26 PROCEDURE — 36415 COLL VENOUS BLD VENIPUNCTURE: CPT

## 2022-07-26 PROCEDURE — 302449 STATCHG TRIAGE ONLY (STATISTIC)

## 2022-07-26 PROCEDURE — 59025 FETAL NON-STRESS TEST: CPT

## 2022-07-26 PROCEDURE — 76815 OB US LIMITED FETUS(S): CPT

## 2022-07-26 PROCEDURE — 89060 EXAM SYNOVIAL FLUID CRYSTALS: CPT

## 2022-07-26 ASSESSMENT — FIBROSIS 4 INDEX: FIB4 SCORE: 0.4

## 2022-07-26 ASSESSMENT — PAIN SCALES - GENERAL: PAINLEVEL: 2

## 2022-07-26 NOTE — PROGRESS NOTES
1400 - Assumed care of pt. Kendra spoke with jennifer Arguello to discharge pt once US back. SVE closed per Kendra RN.   1415 - Discharge paperwork gone over with pt. Pt feels safe to discharge. All precautions gone over with pt. Pt understands to return with any changes or concerns.

## 2022-07-26 NOTE — PROGRESS NOTES
Pt presents to L&D with complaints of VB. Pt ambulated to LDA 4 for assessment.     1136 TOCO and EFM applied, VSS. Pt states she got up to use the restroom and noticed some blood on the toilet paper. When pt was finished using the restroom she noticed additional bleeding in the toilet. Pt reports she did have intercourse on Sunday or Monday. Pt states she has not had any other bleeding but did notice a small amount when she changed here.     1155 RN at bedside, SSE exam performed, no obvious signs or ROM or VB. FERN collected. RN will update DR. Ki Simeon.     1215 Dr. Ki simeon updated, orders US to r/o abruption. SVE upon discharge if everything is WNL.     1300 DILMA - , pt updated on results. Pt assisted up to the restroom.     1310 US at bedside

## 2022-08-11 ENCOUNTER — NON-PROVIDER VISIT (OUTPATIENT)
Dept: MEDICAL GROUP | Facility: MEDICAL CENTER | Age: 27
End: 2022-08-11
Attending: INTERNAL MEDICINE
Payer: MEDICAID

## 2022-08-11 DIAGNOSIS — Z23 NEED FOR VACCINATION: ICD-10-CM

## 2022-08-11 PROCEDURE — 90715 TDAP VACCINE 7 YRS/> IM: CPT

## 2022-08-11 PROCEDURE — 96372 THER/PROPH/DIAG INJ SC/IM: CPT | Performed by: INTERNAL MEDICINE

## 2022-08-11 NOTE — PROGRESS NOTES
"Josie Prajapati is a 27 y.o. female here for a non-provider visit for:   TDAP    Reason for immunization:  Patient is pregnant, vaccine recommended by her OB  Immunization records indicate need for vaccine: Yes, confirmed with PCP  Minimum interval has been met for this vaccine: N/A  ABN completed: Yes    VIS Dated  8/11/22 was given to patient: Yes  All IAC Questionnaire questions were answered \"No.\"    Patient tolerated injection and no adverse effects were observed or reported: Yes    Pt scheduled for next dose in series: 8/11/32  "

## 2022-08-21 ENCOUNTER — APPOINTMENT (OUTPATIENT)
Dept: RADIOLOGY | Facility: MEDICAL CENTER | Age: 27
End: 2022-08-21
Attending: EMERGENCY MEDICINE
Payer: MEDICAID

## 2022-08-21 ENCOUNTER — HOSPITAL ENCOUNTER (EMERGENCY)
Facility: MEDICAL CENTER | Age: 27
End: 2022-08-21
Attending: EMERGENCY MEDICINE
Payer: MEDICAID

## 2022-08-21 VITALS
RESPIRATION RATE: 16 BRPM | HEART RATE: 100 BPM | DIASTOLIC BLOOD PRESSURE: 68 MMHG | HEIGHT: 60 IN | OXYGEN SATURATION: 98 % | BODY MASS INDEX: 32 KG/M2 | TEMPERATURE: 98.8 F | SYSTOLIC BLOOD PRESSURE: 113 MMHG | WEIGHT: 163 LBS

## 2022-08-21 DIAGNOSIS — G51.0 BELL'S PALSY: ICD-10-CM

## 2022-08-21 LAB
ALBUMIN SERPL BCP-MCNC: 3.5 G/DL (ref 3.2–4.9)
ALBUMIN/GLOB SERPL: 1.1 G/DL
ALP SERPL-CCNC: 130 U/L (ref 30–99)
ALT SERPL-CCNC: 12 U/L (ref 2–50)
ANION GAP SERPL CALC-SCNC: 14 MMOL/L (ref 7–16)
AST SERPL-CCNC: 16 U/L (ref 12–45)
BASOPHILS # BLD AUTO: 0.4 % (ref 0–1.8)
BASOPHILS # BLD: 0.03 K/UL (ref 0–0.12)
BILIRUB SERPL-MCNC: 0.4 MG/DL (ref 0.1–1.5)
BUN SERPL-MCNC: 8 MG/DL (ref 8–22)
CALCIUM SERPL-MCNC: 8.6 MG/DL (ref 8.5–10.5)
CHLORIDE SERPL-SCNC: 108 MMOL/L (ref 96–112)
CO2 SERPL-SCNC: 16 MMOL/L (ref 20–33)
CREAT SERPL-MCNC: 0.5 MG/DL (ref 0.5–1.4)
EOSINOPHIL # BLD AUTO: 0.1 K/UL (ref 0–0.51)
EOSINOPHIL NFR BLD: 1.3 % (ref 0–6.9)
ERYTHROCYTE [DISTWIDTH] IN BLOOD BY AUTOMATED COUNT: 39 FL (ref 35.9–50)
GFR SERPLBLD CREATININE-BSD FMLA CKD-EPI: 131 ML/MIN/1.73 M 2
GLOBULIN SER CALC-MCNC: 3.1 G/DL (ref 1.9–3.5)
GLUCOSE SERPL-MCNC: 81 MG/DL (ref 65–99)
HCT VFR BLD AUTO: 35.2 % (ref 37–47)
HGB BLD-MCNC: 11.8 G/DL (ref 12–16)
IMM GRANULOCYTES # BLD AUTO: 0.07 K/UL (ref 0–0.11)
IMM GRANULOCYTES NFR BLD AUTO: 0.9 % (ref 0–0.9)
LYMPHOCYTES # BLD AUTO: 1.71 K/UL (ref 1–4.8)
LYMPHOCYTES NFR BLD: 21.8 % (ref 22–41)
MCH RBC QN AUTO: 27.3 PG (ref 27–33)
MCHC RBC AUTO-ENTMCNC: 33.5 G/DL (ref 33.6–35)
MCV RBC AUTO: 81.3 FL (ref 81.4–97.8)
MONOCYTES # BLD AUTO: 0.58 K/UL (ref 0–0.85)
MONOCYTES NFR BLD AUTO: 7.4 % (ref 0–13.4)
NEUTROPHILS # BLD AUTO: 5.34 K/UL (ref 2–7.15)
NEUTROPHILS NFR BLD: 68.2 % (ref 44–72)
NRBC # BLD AUTO: 0 K/UL
NRBC BLD-RTO: 0 /100 WBC
PLATELET # BLD AUTO: 198 K/UL (ref 164–446)
PMV BLD AUTO: 11.9 FL (ref 9–12.9)
POTASSIUM SERPL-SCNC: 3.8 MMOL/L (ref 3.6–5.5)
PROT SERPL-MCNC: 6.6 G/DL (ref 6–8.2)
RBC # BLD AUTO: 4.33 M/UL (ref 4.2–5.4)
SODIUM SERPL-SCNC: 138 MMOL/L (ref 135–145)
WBC # BLD AUTO: 7.8 K/UL (ref 4.8–10.8)

## 2022-08-21 PROCEDURE — 70498 CT ANGIOGRAPHY NECK: CPT

## 2022-08-21 PROCEDURE — 80053 COMPREHEN METABOLIC PANEL: CPT

## 2022-08-21 PROCEDURE — 700117 HCHG RX CONTRAST REV CODE 255: Performed by: EMERGENCY MEDICINE

## 2022-08-21 PROCEDURE — 99283 EMERGENCY DEPT VISIT LOW MDM: CPT

## 2022-08-21 PROCEDURE — 85025 COMPLETE CBC W/AUTO DIFF WBC: CPT

## 2022-08-21 PROCEDURE — 70496 CT ANGIOGRAPHY HEAD: CPT

## 2022-08-21 PROCEDURE — 36415 COLL VENOUS BLD VENIPUNCTURE: CPT

## 2022-08-21 RX ORDER — FAMCICLOVIR 500 MG/1
500 TABLET ORAL 3 TIMES DAILY
Qty: 21 TABLET | Refills: 0 | Status: SHIPPED | OUTPATIENT
Start: 2022-08-21 | End: 2023-03-03

## 2022-08-21 RX ORDER — METHYLPREDNISOLONE 4 MG/1
TABLET ORAL
Qty: 1 EACH | Refills: 0 | Status: SHIPPED | OUTPATIENT
Start: 2022-08-21 | End: 2022-11-16

## 2022-08-21 RX ADMIN — IOHEXOL 65 ML: 350 INJECTION, SOLUTION INTRAVENOUS at 14:43

## 2022-08-21 ASSESSMENT — FIBROSIS 4 INDEX: FIB4 SCORE: 0.4

## 2022-08-21 NOTE — ED PROVIDER NOTES
ED Provider  Scribed for Bryon Dukes D.O. by Alyssa Chapin. 8/21/2022  1:28 PM    Means of arrival: Walk in   History obtained from: Patient  History limited by: None    CHIEF COMPLAINT  Chief Complaint   Patient presents with    Facial Droop       HPI  Josie Prajapati is a 37 week pregnant 27 y.o. female who presents for right sided facial droop onset last night. The patient reports that the right side of her face began drooping last night but began to improve so she went to bed. She then woke up this morning with worsening facial droop which prompted her to come to the ED for evaluation. She denies any right arm or leg weakness and has been able to ambulate.     REVIEW OF SYSTEMS  See HPI for further details. All other systems are negative.     PAST MEDICAL HISTORY   has a past medical history of Anxiety.    SOCIAL HISTORY  Social History     Tobacco Use    Smoking status: Never    Smokeless tobacco: Never   Vaping Use    Vaping Use: Never used   Substance and Sexual Activity    Alcohol use: Not Currently     Alcohol/week: 1.8 oz     Types: 3 Cans of beer per week    Drug use: Yes     Types: Inhaled     Comment: Arlene-Rarely    Sexual activity: Yes     Partners: Male     Birth control/protection: Implant       SURGICAL HISTORY  patient denies any surgical history    CURRENT MEDICATIONS  Home Medications       Reviewed by Nery Schmid R.N. (Registered Nurse) on 08/21/22 at 1323  Med List Status: Partial     Medication Last Dose Status   hydrOXYzine HCl (ATARAX) 25 MG Tab  Active   loperamide (IMODIUM) 2 MG Cap  Active   Norethindrone Acet-Ethinyl Est (LOESTRIN 1.5/30, 21,) 1.5-30 MG-MCG Tab  Active   ondansetron (ZOFRAN ODT) 4 MG TABLET DISPERSIBLE  Active                    ALLERGIES  No Known Allergies    PHYSICAL EXAM  VITAL SIGNS: /69   Pulse (!) 117   Temp 36.4 °C (97.5 °F) (Oral)   Resp 16   Ht 1.524 m (5')   Wt 73.9 kg (163 lb)   LMP 12/13/2021   SpO2 98%   BMI 31.83 kg/m²    Constitutional: Alert in no apparent distress.  HENT: No signs of trauma, mucous membranes are moist  Eyes: Conjunctiva normal, Non-icteric.   Neck: Normal range of motion, No tenderness, Supple.  Lymphatic: No lymphadenopathy noted.   Cardiovascular: Regular rate and rhythm, no murmurs.   Thorax & Lungs: Normal breath sounds, No respiratory distress, No wheezing, No chest tenderness.   Abdomen: Bowel sounds normal, Soft, No tenderness, No masses, No pulsatile masses. No peritoneal signs.  Skin: Warm, Dry, normal color.   Back: No bony tenderness, No CVA tenderness.   Extremities: No edema, No tenderness, No cyanosis  Musculoskeletal: Good range of motion in all major joints. No tenderness to palpation or major deformities noted.   Neurologic: Alert and oriented x4, Normal motor function, Right facial droop involving the forehead, NIH stroke score 1.   Psychiatric: Affect normal, Judgment normal, Mood normal.         DIAGNOSTIC STUDIES / PROCEDURES    LABS  Results for orders placed or performed during the hospital encounter of 08/21/22   CBC WITH DIFFERENTIAL   Result Value Ref Range    WBC 7.8 4.8 - 10.8 K/uL    RBC 4.33 4.20 - 5.40 M/uL    Hemoglobin 11.8 (L) 12.0 - 16.0 g/dL    Hematocrit 35.2 (L) 37.0 - 47.0 %    MCV 81.3 (L) 81.4 - 97.8 fL    MCH 27.3 27.0 - 33.0 pg    MCHC 33.5 (L) 33.6 - 35.0 g/dL    RDW 39.0 35.9 - 50.0 fL    Platelet Count 198 164 - 446 K/uL    MPV 11.9 9.0 - 12.9 fL    Neutrophils-Polys 68.20 44.00 - 72.00 %    Lymphocytes 21.80 (L) 22.00 - 41.00 %    Monocytes 7.40 0.00 - 13.40 %    Eosinophils 1.30 0.00 - 6.90 %    Basophils 0.40 0.00 - 1.80 %    Immature Granulocytes 0.90 0.00 - 0.90 %    Nucleated RBC 0.00 /100 WBC    Neutrophils (Absolute) 5.34 2.00 - 7.15 K/uL    Lymphs (Absolute) 1.71 1.00 - 4.80 K/uL    Monos (Absolute) 0.58 0.00 - 0.85 K/uL    Eos (Absolute) 0.10 0.00 - 0.51 K/uL    Baso (Absolute) 0.03 0.00 - 0.12 K/uL    Immature Granulocytes (abs) 0.07 0.00 - 0.11 K/uL     NRBC (Absolute) 0.00 K/uL   COMP METABOLIC PANEL   Result Value Ref Range    Sodium 138 135 - 145 mmol/L    Potassium 3.8 3.6 - 5.5 mmol/L    Chloride 108 96 - 112 mmol/L    Co2 16 (L) 20 - 33 mmol/L    Anion Gap 14.0 7.0 - 16.0    Glucose 81 65 - 99 mg/dL    Bun 8 8 - 22 mg/dL    Creatinine 0.50 0.50 - 1.40 mg/dL    Calcium 8.6 8.5 - 10.5 mg/dL    AST(SGOT) 16 12 - 45 U/L    ALT(SGPT) 12 2 - 50 U/L    Alkaline Phosphatase 130 (H) 30 - 99 U/L    Total Bilirubin 0.4 0.1 - 1.5 mg/dL    Albumin 3.5 3.2 - 4.9 g/dL    Total Protein 6.6 6.0 - 8.2 g/dL    Globulin 3.1 1.9 - 3.5 g/dL    A-G Ratio 1.1 g/dL   ESTIMATED GFR   Result Value Ref Range    GFR (CKD-EPI) 131 >60 mL/min/1.73 m 2      All labs reviewed by me.    RADIOLOGY  CT-CTA NECK WITH & W/O-POST PROCESSING   Final Result      1.  Patent carotid and vertebral arteries bilaterally without evidence of stenosis or dissection.   2.  Dominant right vertebral artery.         CT-CTA HEAD WITH & W/O-POST PROCESS   Final Result      1.  No thrombosis is seen within the [place].   2.  No acute intracranial abnormality is identified.   3.  Mucous retention cysts in the left maxillary sinus are noted.   4.  Dominant right vertebral artery.        The radiologist's interpretations of all radiological studies have been reviewed by me.    Films have been independently by me      COURSE  Pertinent Labs & Imaging studies reviewed. (See chart for details)    1:13 PM - Patient seen and examined at charge desk. Discussed plan of care. Ordered for CMP and CBC w/ Diff to evaluate her symptoms.     1:28 PM - I spoke with radiology who states that the patient needs a CT to rule out stroke. Ordered CT-CTA Head with and without and CT-CTA Neck with and without to evaluate.     3:35 PM - Patient was reevaluated at bedside. Discussed lab and radiology results with the patient and informed them there are no signs of a stroke shown on her scans. Patient had the opportunity to ask any  questions. The plan for discharge was discussed with them and she was told to return for any new or worsening symptoms. She was also informed of the plans for follow up. Patient is understanding and agreeable to the plan for discharge.       MEDICAL DECISION MAKING  This is a 27 y.o. female who presents with a right-sided facial droop.  It started last night and then resolved and then was back again this morning is not resolved.  She has tearing of the eye.  She has no weakness of the upper extremity no numbness or weakness of the right lower extremity.  She is have been having no trouble walking talking or holding things.    She is 37 weeks pregnant.    On physical exam she was seen initially at triage desk as a stroke evaluation.  There is no signs of lateralizing symptoms except to the face which is most consistent with Bell's palsy and stroke is doubtful.  The patient was brought to the room and brought to CT scan CT shows no signs of vascular occlusion or stroke or bleed.    Her symptoms are consistent with Bell's palsy, she was placed on antiviral medications and steroid medication.    The patient will return for new or worsening symptoms and is stable at the time of discharge.    The patient is referred to a primary physician for blood pressure management, diabetic screening, and for all other preventative health concerns.    DISPOSITION:  Patient will be discharged home in stable condition.    FOLLOW UP:  No follow-up provider specified.    OUTPATIENT MEDICATIONS:  Discharge Medication List as of 8/21/2022  3:48 PM        START taking these medications    Details   methylPREDNISolone (MEDROL DOSEPAK) 4 MG Tablet Therapy Pack Use as directed, Disp-1 Each, R-0, Normal      famciclovir (FAMVIR) 500 MG Tab Take 1 Tablet by mouth 3 times a day., Disp-21 Tablet, R-0, Normal              FINAL IMPRESSION  1. Bell's palsy         I, Alyssa Chapin (Scribrandi), am scribing for, and in the presence of, Bryon Dukes,  RAJESH.    Electronically signed by: Alyssa Chapin (Scribe), 8/21/2022    IBryon D.O. personally performed the services described in this documentation, as scribed by Alyssa Chapin in my presence, and it is both accurate and complete. C.     The note accurately reflects work and decisions made by me.  Bryon Dukes D.O.  8/21/2022  7:01 PM

## 2022-08-21 NOTE — ED TRIAGE NOTES
Chief Complaint   Patient presents with    Facial Droop     Right facial droop that started yesterday at about 1800.     /69   Pulse (!) 117   Temp 36.4 °C (97.5 °F) (Oral)   Resp 16   Ht 1.524 m (5')   Wt 73.9 kg (163 lb)   LMP 12/13/2021   SpO2 98%   BMI 31.83 kg/m²      Face Mask

## 2022-08-21 NOTE — DISCHARGE INSTRUCTIONS
Your CT scan is normal there is no signs of a stroke.  This is consistent with disease called Bell's palsy.  This is a problem with the facial nerve, sometimes he can have pressure on the nerve or could have inflammation from a virus.    You are being placed on a steroid medication, and also an antiviral medication.  These are safe for pregnancy.  Please take these medications as prescribed and continue follow-up with your OB doctor

## 2022-08-21 NOTE — ED NOTES
Reviewed discharge instructions with patient and answered any questions. Patient informed of prescriptions to  at pharmacy. Patient discharged home and ambulates with steady gait.

## 2022-08-22 ENCOUNTER — APPOINTMENT (OUTPATIENT)
Dept: RADIOLOGY | Facility: MEDICAL CENTER | Age: 27
End: 2022-08-22
Attending: OBSTETRICS & GYNECOLOGY
Payer: MEDICAID

## 2022-08-22 ENCOUNTER — HOSPITAL ENCOUNTER (EMERGENCY)
Facility: MEDICAL CENTER | Age: 27
End: 2022-08-22
Attending: OBSTETRICS & GYNECOLOGY | Admitting: OBSTETRICS & GYNECOLOGY
Payer: MEDICAID

## 2022-08-22 VITALS
DIASTOLIC BLOOD PRESSURE: 58 MMHG | OXYGEN SATURATION: 96 % | WEIGHT: 165 LBS | SYSTOLIC BLOOD PRESSURE: 114 MMHG | TEMPERATURE: 98 F | RESPIRATION RATE: 14 BRPM | HEART RATE: 104 BPM | BODY MASS INDEX: 32.39 KG/M2 | HEIGHT: 60 IN

## 2022-08-22 PROCEDURE — 59025 FETAL NON-STRESS TEST: CPT | Mod: 25

## 2022-08-22 PROCEDURE — 76819 FETAL BIOPHYS PROFIL W/O NST: CPT

## 2022-08-22 PROCEDURE — 302449 STATCHG TRIAGE ONLY (STATISTIC)

## 2022-08-22 RX ORDER — BETAMETHASONE SODIUM PHOSPHATE AND BETAMETHASONE ACETATE 3; 3 MG/ML; MG/ML
12 INJECTION, SUSPENSION INTRA-ARTICULAR; INTRALESIONAL; INTRAMUSCULAR; SOFT TISSUE EVERY 24 HOURS
Status: DISCONTINUED | OUTPATIENT
Start: 2022-08-22 | End: 2022-08-22 | Stop reason: HOSPADM

## 2022-08-22 ASSESSMENT — FIBROSIS 4 INDEX: FIB4 SCORE: 0.65

## 2022-08-22 ASSESSMENT — PAIN SCALES - GENERAL: PAINLEVEL: 0 - NO PAIN

## 2022-08-22 NOTE — PROGRESS NOTES
1644-Pt here from Dr Ki Panchal office for monitoring and US. FHT deceleration occurred during monitoring in office. Pt placed on monitors (us and toco) Orders placed. Pt denies any contractions, leaking or bleeding and reports + FM. Orders reviewed with pt, pt denies any needs at this time.   1724-US at bedside. Unable to perform doppler studies unless BPP is 6/8, even if MD ordered. Dr Ki Araujo called and update regarding order. Orders received to call MD after US for discuss plan further.  1820-Dr Ki Araujo Called. POC dicussed in detail. IOL timing reviewed. MD reviewing prenatal records and will call back.   1842-Dr Ki Araujo called, orders received to given steroids tonight. Pt to return tomorrow pm for second does of steroids and NST. Then pt to return on 8/24 (37.0 wks for IOL)  1857-Dr Ki Araujo called, Per MD do not given steriods. Pt will get NST in Dr office tomorrow and return to [facility] on 8/24 at 1400 for IOl. Discharge order received.   1908-Discharge instructions reviewed with pt. Pt states understanding. Labor precautions, monitoring tomorrow and IOl reviewed. Pt denies any questions.   1909-Pt discharged home

## 2022-08-23 NOTE — DISCHARGE INSTRUCTIONS
Need NST at  appointment 8/23    IOL at [facility] 8/24 at 2pm       General Instructions:  If you think you are in labor, time contractions (lying on your left side) from the beginning of one contraction to the beginning of the next contraction for at least one hour.  Increase fluid intake: you should consume 10-12 8 oz glasses of non-caffeinated fluid per day.  Report any pressure or burning on urination to your physician.  Monitor fetal movement: If you notice an absence or decrease in fetal movement, drink a large glass of water and rest on your side.  If there is no increase in movement, call your physician or go to the hospital for further evaluation.  Report any sudden, sharp abdominal pain.  Report any bleeding.  Spotting or pinkish discharge is normal after vaginal exam.  You may also spot after sexual intercourse.    Labor Instructions (37 - 39 weeks):  Call your physician or return to hospital if:  You have regular contractions that get progressively closer, longer and stronger.  Your water breaks (remember time and color).  You have bleeding like a period.  Your baby does not move enough to complete the daily kick counts (10 movements in 2 hours)  Your baby moves much less often than on the days before or you have not felt your baby move all day.    Other Instructions:  Please carefully review your entire AFTER VISIT SUMMARY document for all discharge instructions.

## 2022-08-24 ENCOUNTER — APPOINTMENT (OUTPATIENT)
Dept: OBGYN | Facility: MEDICAL CENTER | Age: 27
End: 2022-08-24
Attending: OBSTETRICS & GYNECOLOGY
Payer: MEDICAID

## 2022-08-24 ENCOUNTER — HOSPITAL ENCOUNTER (INPATIENT)
Facility: MEDICAL CENTER | Age: 27
LOS: 2 days | End: 2022-08-26
Attending: OBSTETRICS & GYNECOLOGY | Admitting: OBSTETRICS & GYNECOLOGY
Payer: MEDICAID

## 2022-08-24 LAB
BASOPHILS # BLD AUTO: 0.5 % (ref 0–1.8)
BASOPHILS # BLD: 0.05 K/UL (ref 0–0.12)
EOSINOPHIL # BLD AUTO: 0.06 K/UL (ref 0–0.51)
EOSINOPHIL NFR BLD: 0.6 % (ref 0–6.9)
ERYTHROCYTE [DISTWIDTH] IN BLOOD BY AUTOMATED COUNT: 39.8 FL (ref 35.9–50)
HCT VFR BLD AUTO: 37.5 % (ref 37–47)
HGB BLD-MCNC: 12.3 G/DL (ref 12–16)
HOLDING TUBE BB 8507: NORMAL
IMM GRANULOCYTES # BLD AUTO: 0.14 K/UL (ref 0–0.11)
IMM GRANULOCYTES NFR BLD AUTO: 1.3 % (ref 0–0.9)
LYMPHOCYTES # BLD AUTO: 1.82 K/UL (ref 1–4.8)
LYMPHOCYTES NFR BLD: 17.3 % (ref 22–41)
MCH RBC QN AUTO: 27.3 PG (ref 27–33)
MCHC RBC AUTO-ENTMCNC: 32.8 G/DL (ref 33.6–35)
MCV RBC AUTO: 83.1 FL (ref 81.4–97.8)
MONOCYTES # BLD AUTO: 0.9 K/UL (ref 0–0.85)
MONOCYTES NFR BLD AUTO: 8.5 % (ref 0–13.4)
NEUTROPHILS # BLD AUTO: 7.56 K/UL (ref 2–7.15)
NEUTROPHILS NFR BLD: 71.8 % (ref 44–72)
NRBC # BLD AUTO: 0 K/UL
NRBC BLD-RTO: 0 /100 WBC
PLATELET # BLD AUTO: 228 K/UL (ref 164–446)
PMV BLD AUTO: 12.5 FL (ref 9–12.9)
RBC # BLD AUTO: 4.51 M/UL (ref 4.2–5.4)
WBC # BLD AUTO: 10.5 K/UL (ref 4.8–10.8)

## 2022-08-24 PROCEDURE — 85025 COMPLETE CBC W/AUTO DIFF WBC: CPT

## 2022-08-24 PROCEDURE — 700102 HCHG RX REV CODE 250 W/ 637 OVERRIDE(OP): Performed by: OBSTETRICS & GYNECOLOGY

## 2022-08-24 PROCEDURE — 770002 HCHG ROOM/CARE - OB PRIVATE (112)

## 2022-08-24 PROCEDURE — 700111 HCHG RX REV CODE 636 W/ 250 OVERRIDE (IP): Performed by: OBSTETRICS & GYNECOLOGY

## 2022-08-24 PROCEDURE — 3E033VJ INTRODUCTION OF OTHER HORMONE INTO PERIPHERAL VEIN, PERCUTANEOUS APPROACH: ICD-10-PCS | Performed by: OBSTETRICS & GYNECOLOGY

## 2022-08-24 PROCEDURE — 36415 COLL VENOUS BLD VENIPUNCTURE: CPT

## 2022-08-24 PROCEDURE — A9270 NON-COVERED ITEM OR SERVICE: HCPCS | Performed by: OBSTETRICS & GYNECOLOGY

## 2022-08-24 PROCEDURE — 700105 HCHG RX REV CODE 258: Performed by: OBSTETRICS & GYNECOLOGY

## 2022-08-24 RX ORDER — METHYLPREDNISOLONE 4 MG/1
4 TABLET ORAL
Status: DISCONTINUED | OUTPATIENT
Start: 2022-08-27 | End: 2022-08-26 | Stop reason: HOSPADM

## 2022-08-24 RX ORDER — IBUPROFEN 800 MG/1
800 TABLET ORAL
Status: DISCONTINUED | OUTPATIENT
Start: 2022-08-24 | End: 2022-08-25 | Stop reason: HOSPADM

## 2022-08-24 RX ORDER — ACETAMINOPHEN 500 MG
1000 TABLET ORAL
Status: DISCONTINUED | OUTPATIENT
Start: 2022-08-24 | End: 2022-08-25 | Stop reason: HOSPADM

## 2022-08-24 RX ORDER — METHYLPREDNISOLONE 4 MG/1
4 TABLET ORAL 2 TIMES DAILY
Status: DISCONTINUED | OUTPATIENT
Start: 2022-08-24 | End: 2022-08-24

## 2022-08-24 RX ORDER — METHYLPREDNISOLONE 4 MG/1
4 TABLET ORAL 2 TIMES DAILY
Status: DISCONTINUED | OUTPATIENT
Start: 2022-08-26 | End: 2022-08-26 | Stop reason: HOSPADM

## 2022-08-24 RX ORDER — TERBUTALINE SULFATE 1 MG/ML
0.25 INJECTION, SOLUTION SUBCUTANEOUS
Status: DISCONTINUED | OUTPATIENT
Start: 2022-08-24 | End: 2022-08-25 | Stop reason: HOSPADM

## 2022-08-24 RX ORDER — METHYLPREDNISOLONE 4 MG/1
4 TABLET ORAL
Status: DISPENSED | OUTPATIENT
Start: 2022-08-25 | End: 2022-08-26

## 2022-08-24 RX ORDER — SODIUM CHLORIDE, SODIUM LACTATE, POTASSIUM CHLORIDE, CALCIUM CHLORIDE 600; 310; 30; 20 MG/100ML; MG/100ML; MG/100ML; MG/100ML
INJECTION, SOLUTION INTRAVENOUS CONTINUOUS
Status: DISCONTINUED | OUTPATIENT
Start: 2022-08-24 | End: 2022-08-26

## 2022-08-24 RX ORDER — METHYLPREDNISOLONE 4 MG/1
4 TABLET ORAL
Status: COMPLETED | OUTPATIENT
Start: 2022-08-24 | End: 2022-08-24

## 2022-08-24 RX ORDER — OXYTOCIN 10 [USP'U]/ML
10 INJECTION, SOLUTION INTRAMUSCULAR; INTRAVENOUS
Status: DISCONTINUED | OUTPATIENT
Start: 2022-08-24 | End: 2022-08-25 | Stop reason: HOSPADM

## 2022-08-24 RX ORDER — FAMCICLOVIR 500 MG/1
500 TABLET ORAL 3 TIMES DAILY
Status: DISCONTINUED | OUTPATIENT
Start: 2022-08-24 | End: 2022-08-26 | Stop reason: HOSPADM

## 2022-08-24 RX ADMIN — FAMCICLOVIR 500 MG: 500 TABLET, FILM COATED ORAL at 22:13

## 2022-08-24 RX ADMIN — SODIUM CHLORIDE, POTASSIUM CHLORIDE, SODIUM LACTATE AND CALCIUM CHLORIDE: 600; 310; 30; 20 INJECTION, SOLUTION INTRAVENOUS at 21:07

## 2022-08-24 RX ADMIN — OXYTOCIN 2 MILLI-UNITS/MIN: 10 INJECTION, SOLUTION INTRAMUSCULAR; INTRAVENOUS at 21:07

## 2022-08-24 RX ADMIN — METHYLPREDNISOLONE 4 MG: 4 TABLET ORAL at 22:14

## 2022-08-24 ASSESSMENT — LIFESTYLE VARIABLES
ON A TYPICAL DAY WHEN YOU DRINK ALCOHOL HOW MANY DRINKS DO YOU HAVE: 0
CONSUMPTION TOTAL: INCOMPLETE
EVER_SMOKED: NEVER
TOTAL SCORE: 0
DOES PATIENT WANT TO STOP DRINKING: NO
TOTAL SCORE: 0
HAVE YOU EVER FELT YOU SHOULD CUT DOWN ON YOUR DRINKING: NO
EVER FELT BAD OR GUILTY ABOUT YOUR DRINKING: NO
HAVE PEOPLE ANNOYED YOU BY CRITICIZING YOUR DRINKING: NO
TOTAL SCORE: 0
ALCOHOL_USE: NO
EVER HAD A DRINK FIRST THING IN THE MORNING TO STEADY YOUR NERVES TO GET RID OF A HANGOVER: NO

## 2022-08-24 ASSESSMENT — PATIENT HEALTH QUESTIONNAIRE - PHQ9
2. FEELING DOWN, DEPRESSED, IRRITABLE, OR HOPELESS: SEVERAL DAYS
8. MOVING OR SPEAKING SO SLOWLY THAT OTHER PEOPLE COULD HAVE NOTICED. OR THE OPPOSITE, BEING SO FIGETY OR RESTLESS THAT YOU HAVE BEEN MOVING AROUND A LOT MORE THAN USUAL: NOT AT ALL
7. TROUBLE CONCENTRATING ON THINGS, SUCH AS READING THE NEWSPAPER OR WATCHING TELEVISION: SEVERAL DAYS
4. FEELING TIRED OR HAVING LITTLE ENERGY: SEVERAL DAYS
6. FEELING BAD ABOUT YOURSELF - OR THAT YOU ARE A FAILURE OR HAVE LET YOURSELF OR YOUR FAMILY DOWN: SEVERAL DAYS
3. TROUBLE FALLING OR STAYING ASLEEP OR SLEEPING TOO MUCH: NOT AT ALL
SUM OF ALL RESPONSES TO PHQ QUESTIONS 1-9: 6
1. LITTLE INTEREST OR PLEASURE IN DOING THINGS: SEVERAL DAYS
9. THOUGHTS THAT YOU WOULD BE BETTER OFF DEAD, OR OF HURTING YOURSELF: NOT AT ALL
5. POOR APPETITE OR OVEREATING: SEVERAL DAYS
SUM OF ALL RESPONSES TO PHQ9 QUESTIONS 1 AND 2: 2

## 2022-08-24 ASSESSMENT — FIBROSIS 4 INDEX: FIB4 SCORE: 0.65

## 2022-08-24 ASSESSMENT — COPD QUESTIONNAIRES
HAVE YOU SMOKED AT LEAST 100 CIGARETTES IN YOUR ENTIRE LIFE: NO/DON'T KNOW
DURING THE PAST 4 WEEKS HOW MUCH DID YOU FEEL SHORT OF BREATH: NONE/LITTLE OF THE TIME
IN THE PAST 12 MONTHS DO YOU DO LESS THAN YOU USED TO BECAUSE OF YOUR BREATHING PROBLEMS: DISAGREE/UNSURE
DO YOU EVER COUGH UP ANY MUCUS OR PHLEGM?: NO/ONLY WITH OCCASIONAL COLDS OR INFECTIONS

## 2022-08-24 ASSESSMENT — PAIN SCALES - GENERAL: PAINLEVEL: 0 - NO PAIN

## 2022-08-24 NOTE — PROGRESS NOTES
1417: A  ambulated to L&D for a scheduled IOL d/t IUGR.  1451: Pt in labor bed; monitors on x 2; VSS.  1520: SVE Cl/thk/-3  1535: Dr. Inna Araujo updated on pt status; orders received to start cytotec for IOL.  1600: Pt voices desire to speak w/ MD to discuss POC prior to IOL.  1626: Inna Araujo MD notified of pt status/desire; MD to come to bedside.  1828: Inna Rodriguez MD at bedside to discuss POC w/ pt; SVE 2/50/-3; Cytotec contraindicated; orders received to start pitocin.  190: Report given to QUIQUE Molina.

## 2022-08-25 ENCOUNTER — ANESTHESIA EVENT (OUTPATIENT)
Dept: ANESTHESIOLOGY | Facility: MEDICAL CENTER | Age: 27
End: 2022-08-25
Payer: MEDICAID

## 2022-08-25 ENCOUNTER — ANESTHESIA (OUTPATIENT)
Dept: ANESTHESIOLOGY | Facility: MEDICAL CENTER | Age: 27
End: 2022-08-25
Payer: MEDICAID

## 2022-08-25 LAB — PATHOLOGY CONSULT NOTE: NORMAL

## 2022-08-25 PROCEDURE — 700111 HCHG RX REV CODE 636 W/ 250 OVERRIDE (IP): Performed by: ANESTHESIOLOGY

## 2022-08-25 PROCEDURE — 01967 NEURAXL LBR ANES VAG DLVR: CPT | Performed by: ANESTHESIOLOGY

## 2022-08-25 PROCEDURE — 59409 OBSTETRICAL CARE: CPT

## 2022-08-25 PROCEDURE — 303615 HCHG EPIDURAL/SPINAL ANESTHESIA FOR LABOR

## 2022-08-25 PROCEDURE — 700111 HCHG RX REV CODE 636 W/ 250 OVERRIDE (IP): Performed by: OBSTETRICS & GYNECOLOGY

## 2022-08-25 PROCEDURE — A9270 NON-COVERED ITEM OR SERVICE: HCPCS | Performed by: OBSTETRICS & GYNECOLOGY

## 2022-08-25 PROCEDURE — 700111 HCHG RX REV CODE 636 W/ 250 OVERRIDE (IP)

## 2022-08-25 PROCEDURE — 304965 HCHG RECOVERY SERVICES

## 2022-08-25 PROCEDURE — 88307 TISSUE EXAM BY PATHOLOGIST: CPT

## 2022-08-25 PROCEDURE — 700105 HCHG RX REV CODE 258: Performed by: OBSTETRICS & GYNECOLOGY

## 2022-08-25 PROCEDURE — 770002 HCHG ROOM/CARE - OB PRIVATE (112)

## 2022-08-25 PROCEDURE — 700102 HCHG RX REV CODE 250 W/ 637 OVERRIDE(OP): Performed by: OBSTETRICS & GYNECOLOGY

## 2022-08-25 RX ORDER — SODIUM CHLORIDE, SODIUM LACTATE, POTASSIUM CHLORIDE, CALCIUM CHLORIDE 600; 310; 30; 20 MG/100ML; MG/100ML; MG/100ML; MG/100ML
INJECTION, SOLUTION INTRAVENOUS PRN
Status: DISCONTINUED | OUTPATIENT
Start: 2022-08-25 | End: 2022-08-26 | Stop reason: HOSPADM

## 2022-08-25 RX ORDER — SODIUM CHLORIDE, SODIUM LACTATE, POTASSIUM CHLORIDE, AND CALCIUM CHLORIDE .6; .31; .03; .02 G/100ML; G/100ML; G/100ML; G/100ML
1000 INJECTION, SOLUTION INTRAVENOUS
Status: DISCONTINUED | OUTPATIENT
Start: 2022-08-25 | End: 2022-08-25 | Stop reason: HOSPADM

## 2022-08-25 RX ORDER — ONDANSETRON 2 MG/ML
4 INJECTION INTRAMUSCULAR; INTRAVENOUS EVERY 4 HOURS PRN
Status: DISCONTINUED | OUTPATIENT
Start: 2022-08-25 | End: 2022-08-25 | Stop reason: HOSPADM

## 2022-08-25 RX ORDER — ROPIVACAINE HYDROCHLORIDE 2 MG/ML
INJECTION, SOLUTION EPIDURAL; INFILTRATION; PERINEURAL CONTINUOUS
Status: DISCONTINUED | OUTPATIENT
Start: 2022-08-25 | End: 2022-08-26

## 2022-08-25 RX ORDER — DOCUSATE SODIUM 100 MG/1
100 CAPSULE, LIQUID FILLED ORAL 2 TIMES DAILY PRN
Status: DISCONTINUED | OUTPATIENT
Start: 2022-08-25 | End: 2022-08-26 | Stop reason: HOSPADM

## 2022-08-25 RX ORDER — BUPIVACAINE HYDROCHLORIDE 2.5 MG/ML
INJECTION, SOLUTION EPIDURAL; INFILTRATION; INTRACAUDAL PRN
Status: DISCONTINUED | OUTPATIENT
Start: 2022-08-25 | End: 2022-08-25 | Stop reason: HOSPADM

## 2022-08-25 RX ORDER — ROPIVACAINE HYDROCHLORIDE 2 MG/ML
INJECTION, SOLUTION EPIDURAL; INFILTRATION; PERINEURAL
Status: COMPLETED
Start: 2022-08-25 | End: 2022-08-25

## 2022-08-25 RX ORDER — SODIUM CHLORIDE, SODIUM LACTATE, POTASSIUM CHLORIDE, AND CALCIUM CHLORIDE .6; .31; .03; .02 G/100ML; G/100ML; G/100ML; G/100ML
250 INJECTION, SOLUTION INTRAVENOUS PRN
Status: DISCONTINUED | OUTPATIENT
Start: 2022-08-25 | End: 2022-08-25 | Stop reason: HOSPADM

## 2022-08-25 RX ORDER — IBUPROFEN 800 MG/1
800 TABLET ORAL EVERY 8 HOURS PRN
Status: DISCONTINUED | OUTPATIENT
Start: 2022-08-25 | End: 2022-08-26 | Stop reason: HOSPADM

## 2022-08-25 RX ORDER — ACETAMINOPHEN 500 MG
1000 TABLET ORAL EVERY 6 HOURS PRN
Status: DISCONTINUED | OUTPATIENT
Start: 2022-08-25 | End: 2022-08-26 | Stop reason: HOSPADM

## 2022-08-25 RX ADMIN — FAMCICLOVIR 500 MG: 500 TABLET, FILM COATED ORAL at 12:17

## 2022-08-25 RX ADMIN — METHYLPREDNISOLONE 4 MG: 4 TABLET ORAL at 11:42

## 2022-08-25 RX ADMIN — FENTANYL CITRATE 100 MCG: 50 INJECTION, SOLUTION INTRAMUSCULAR; INTRAVENOUS at 06:15

## 2022-08-25 RX ADMIN — SODIUM CHLORIDE, POTASSIUM CHLORIDE, SODIUM LACTATE AND CALCIUM CHLORIDE: 600; 310; 30; 20 INJECTION, SOLUTION INTRAVENOUS at 09:10

## 2022-08-25 RX ADMIN — FAMCICLOVIR 500 MG: 500 TABLET, FILM COATED ORAL at 06:15

## 2022-08-25 RX ADMIN — ROPIVACAINE HYDROCHLORIDE 200 MG: 2 INJECTION, SOLUTION EPIDURAL; INFILTRATION at 09:07

## 2022-08-25 RX ADMIN — FENTANYL CITRATE 100 MCG: 50 INJECTION, SOLUTION INTRAMUSCULAR; INTRAVENOUS at 07:41

## 2022-08-25 RX ADMIN — SODIUM CHLORIDE, POTASSIUM CHLORIDE, SODIUM LACTATE AND CALCIUM CHLORIDE: 600; 310; 30; 20 INJECTION, SOLUTION INTRAVENOUS at 04:48

## 2022-08-25 RX ADMIN — IBUPROFEN 800 MG: 800 TABLET, FILM COATED ORAL at 15:50

## 2022-08-25 RX ADMIN — FAMCICLOVIR 500 MG: 500 TABLET, FILM COATED ORAL at 17:07

## 2022-08-25 RX ADMIN — OXYTOCIN 2000 ML/HR: 10 INJECTION INTRAVENOUS at 11:00

## 2022-08-25 RX ADMIN — OXYTOCIN 12 MILLI-UNITS/MIN: 10 INJECTION, SOLUTION INTRAMUSCULAR; INTRAVENOUS at 09:13

## 2022-08-25 RX ADMIN — METHYLPREDNISOLONE 4 MG: 4 TABLET ORAL at 17:07

## 2022-08-25 RX ADMIN — OXYTOCIN 125 ML/HR: 10 INJECTION, SOLUTION INTRAMUSCULAR; INTRAVENOUS at 11:40

## 2022-08-25 RX ADMIN — BUPIVACAINE HYDROCHLORIDE 5 ML: 2.5 INJECTION, SOLUTION EPIDURAL; INFILTRATION; INTRACAUDAL; PERINEURAL at 09:04

## 2022-08-25 RX ADMIN — ROPIVACAINE HYDROCHLORIDE 200 MG: 2 INJECTION, SOLUTION EPIDURAL; INFILTRATION; PERINEURAL at 09:07

## 2022-08-25 ASSESSMENT — PAIN DESCRIPTION - PAIN TYPE
TYPE: ACUTE PAIN

## 2022-08-25 ASSESSMENT — PAIN SCALES - GENERAL: PAIN_LEVEL: 0

## 2022-08-25 NOTE — PROGRESS NOTES
0730 Rn at bedside for SVE. Some bloody show noted.     0939 Dr. Ki Araujo notified of SVE. Some jin red blood noted and MD aware.     0943 Dr. Ki Araujo notified that pt feeling increased pressure, ruptured, fluid clear and bloody. MD at bedside.

## 2022-08-25 NOTE — H&P
History and Physical      Josiegeraldine Prajapati is a 27 y.o. female  at 37w0d who presents for IOL for IUGR AC<1%    Subjective:   negative  For CTXS.   negative Feels pain   negative for LOF  negative for vaginal bleeding.   positive for fetal movement    ROS: A comprehensive review of systems was negative.    Past Medical History:   Diagnosis Date    Anxiety     Facial paralysis/[place] palsy 2022     History reviewed. No pertinent surgical history.  Family History   Problem Relation Age of Onset    Hypertension Mother     Hyperlipidemia Father     Cancer Cousin         leukemia    Diabetes Neg Hx     Heart Disease Neg Hx     Stroke Neg Hx      OB History    Para Term  AB Living   5 2 2 0 1 2   SAB IAB Ectopic Molar Multiple Live Births   0 0 0 0   2      # Outcome Date GA Lbr Haseeb/2nd Weight Sex Delivery Anes PTL Lv   5 Current            4 AB            3 Term            2 Term         LEANDRA   1          LEANDRA     Social History     Socioeconomic History    Marital status: Single     Spouse name: Not on file    Number of children: Not on file    Years of education: Not on file    Highest education level: Not on file   Occupational History    Not on file   Tobacco Use    Smoking status: Never    Smokeless tobacco: Never   Vaping Use    Vaping Use: Never used   Substance and Sexual Activity    Alcohol use: Not Currently     Alcohol/week: 1.8 oz     Types: 3 Cans of beer per week    Drug use: Never     Types: Inhaled     Comment: Arlene-Rarely    Sexual activity: Yes     Partners: Male     Birth control/protection: Implant   Other Topics Concern    Not on file   Social History Narrative    ** Merged History Encounter **          Social Determinants of Health     Financial Resource Strain: Not on file   Food Insecurity: Not on file   Transportation Needs: Not on file   Physical Activity: Not on file   Stress: Not on file   Social Connections: Not on file   Intimate Partner Violence:  Not on file   Housing Stability: Not on file     Allergies: Patient has no known allergies.    Current Facility-Administered Medications:     LR infusion, , Intravenous, Continuous, Gayle Arechiga M.D., Held at 08/24/22 1615    terbutaline (BRETHINE) injection 0.25 mg, 0.25 mg, Subcutaneous, Once PRN, Gayle Arechiga M.D.    oxytocin (PITOCIN) infusion (for post delivery), 2,000 mL/hr, Intravenous, Once, Held at 08/24/22 1615 **FOLLOWED BY** oxytocin (PITOCIN) infusion (for post delivery), 125 mL/hr, Intravenous, Continuous, Gayle Arechiga M.D., Held at 08/24/22 1615    oxytocin (PITOCIN) injection 10 Units, 10 Units, Intramuscular, Once PRN, Gayle Arechiga M.D.    ibuprofen (MOTRIN) tablet 800 mg, 800 mg, Oral, Once PRN, Gayle Arechiga M.D.    acetaminophen (TYLENOL) tablet 1,000 mg, 1,000 mg, Oral, Once PRN, Gayle Arechiga M.D.    miSOPROStol (CYTOTEC) tablet 25 mcg, 25 mcg, Oral, Once, Gayle Arechiga M.D.    terbutaline (BRETHINE) injection 0.25 mg, 0.25 mg, Subcutaneous, Once PRN, Gayle Arechiga M.D.    methylPREDNISolone (MEDROL) 4 mg tablet, 4 mg, Oral, BID, Gayle Arechiga M.D.    famciclovir (FAMVIR) tablet 500 mg, 500 mg, Oral, TID, Gayle Arechiga M.D.    oxytocin (PITOCIN) 20 UNITS/1000ML LR (induction of labor), 0.5-20 barb-units/min, Intravenous, Continuous, Gayle Arechiga M.D.    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 08/11/2021    Avascular necrosis of lunate (HCC) 06/17/2021    Anxiety 04/17/2020    Chronic diarrhea 11/29/2018     Objective:      /67   Pulse (!) 107   Temp 36.6 °C (97.9 °F) (Temporal)   Resp 18   Ht 1.524 m (5')   Wt 74.4 kg (164  lb)   SpO2 97%     General:   no acute distress, alert, cooperative   Skin:   normal   HEENT:  Sclera clear, anicteric   Lungs:   CTA bilateral   Heart:   S1, S2 normal, no murmur, click, rub or gallop, regular rate and rhythm, brisk carotid upstroke without bruits, peripheral pulses very brisk, chest is clear without rales or wheezing, no pedal edema, no JVD, no hepatosplenomegaly   Abdomen:   gravid, NT   EFW:  2300   Pelvis:  adequate with gynecoid pelvis   FHT:  150 BPM   Uterine Size: S<D   Presentations: Cephalic   Cervix:     Dilation: 2cm    Effacement: 50%    Station:  -3    Consistency: Soft    Position: Middle     Lab Review  Lab:   Blood type:      Recent Results (from the past 5880 hour(s))   CBC WITH DIFFERENTIAL    Collection Time: 12/27/21 12:27 PM   Result Value Ref Range    WBC 11.1 (H) 4.8 - 10.8 K/uL    RBC 5.66 (H) 4.20 - 5.40 M/uL    Hemoglobin 16.9 (H) 12.0 - 16.0 g/dL    Hematocrit 51.0 (H) 37.0 - 47.0 %    MCV 90.1 81.4 - 97.8 fL    MCH 29.9 27.0 - 33.0 pg    MCHC 33.1 (L) 33.6 - 35.0 g/dL    RDW 39.8 35.9 - 50.0 fL    Platelet Count 281 164 - 446 K/uL    MPV 11.1 9.0 - 12.9 fL    Neutrophils-Polys 70.40 44.00 - 72.00 %    Lymphocytes 19.30 (L) 22.00 - 41.00 %    Monocytes 7.20 0.00 - 13.40 %    Eosinophils 2.10 0.00 - 6.90 %    Basophils 0.50 0.00 - 1.80 %    Immature Granulocytes 0.50 0.00 - 0.90 %    Nucleated RBC 0.00 /100 WBC    Neutrophils (Absolute) 7.80 (H) 2.00 - 7.15 K/uL    Lymphs (Absolute) 2.14 1.00 - 4.80 K/uL    Monos (Absolute) 0.80 0.00 - 0.85 K/uL    Eos (Absolute) 0.23 0.00 - 0.51 K/uL    Baso (Absolute) 0.06 0.00 - 0.12 K/uL    Immature Granulocytes (abs) 0.05 0.00 - 0.11 K/uL    NRBC (Absolute) 0.00 K/uL   COMP METABOLIC PANEL    Collection Time: 12/27/21 12:27 PM   Result Value Ref Range    Sodium 140 135 - 145 mmol/L    Potassium 4.1 3.6 - 5.5 mmol/L    Chloride 104 96 - 112 mmol/L    Co2 22 20 - 33 mmol/L    Anion Gap 14.0 7.0 - 16.0    Glucose 101 (H) 65 - 99 mg/dL     Bun 27 (H) 8 - 22 mg/dL    Creatinine 1.42 (H) 0.50 - 1.40 mg/dL    Calcium 9.5 8.4 - 10.2 mg/dL    AST(SGOT) 19 12 - 45 U/L    ALT(SGPT) 22 2 - 50 U/L    Alkaline Phosphatase 93 30 - 99 U/L    Total Bilirubin 0.7 0.1 - 1.5 mg/dL    Albumin 4.6 3.2 - 4.9 g/dL    Total Protein 8.6 (H) 6.0 - 8.2 g/dL    Globulin 4.0 (H) 1.9 - 3.5 g/dL    A-G Ratio 1.2 g/dL   LIPASE    Collection Time: 12/27/21 12:27 PM   Result Value Ref Range    Lipase 21 7 - 58 U/L   URINALYSIS,CULTURE IF INDICATED    Collection Time: 12/27/21 12:27 PM    Specimen: Urine, Clean Catch; Blood   Result Value Ref Range    Color Yellow     Character Clear     Specific Gravity 1.020 <1.035    Ph 6.0 5.0 - 8.0    Glucose Negative Negative mg/dL    Ketones Trace (A) Negative mg/dL    Protein 100 (A) Negative mg/dL    Bilirubin Negative Negative    Nitrite Negative Negative    Leukocyte Esterase Moderate (A) Negative    Occult Blood Moderate (A) Negative    Micro Urine Req Microscopic    HCG QUAL SERUM    Collection Time: 12/27/21 12:27 PM   Result Value Ref Range    Beta-Hcg Qualitative Serum Negative Negative   URINE MICROSCOPIC (W/UA)    Collection Time: 12/27/21 12:27 PM   Result Value Ref Range    WBC 5-10 (A) /hpf    RBC 2-5 (A) /hpf    Bacteria Few (A) None /hpf    Epithelial Cells Few Few /hpf    Mucous Threads Moderate /hpf    Urine Crystals Few Amorphous /hpf   ESTIMATED GFR    Collection Time: 12/27/21 12:27 PM   Result Value Ref Range    GFR If  54 (A) >60 mL/min/1.73 m 2    GFR If Non  44 (A) >60 mL/min/1.73 m 2   LACTIC ACID    Collection Time: 12/27/21  3:29 PM   Result Value Ref Range    Lactic Acid 1.3 0.5 - 2.0 mmol/L   Ferning if suspected rupture of membranes (ROM)    Collection Time: 07/26/22 12:10 PM   Result Value Ref Range    Fern Test On Amniotic Fluid see below Not present   CBC WITH DIFFERENTIAL    Collection Time: 08/21/22  1:40 PM   Result Value Ref Range    WBC 7.8 4.8 - 10.8 K/uL    RBC 4.33  4.20 - 5.40 M/uL    Hemoglobin 11.8 (L) 12.0 - 16.0 g/dL    Hematocrit 35.2 (L) 37.0 - 47.0 %    MCV 81.3 (L) 81.4 - 97.8 fL    MCH 27.3 27.0 - 33.0 pg    MCHC 33.5 (L) 33.6 - 35.0 g/dL    RDW 39.0 35.9 - 50.0 fL    Platelet Count 198 164 - 446 K/uL    MPV 11.9 9.0 - 12.9 fL    Neutrophils-Polys 68.20 44.00 - 72.00 %    Lymphocytes 21.80 (L) 22.00 - 41.00 %    Monocytes 7.40 0.00 - 13.40 %    Eosinophils 1.30 0.00 - 6.90 %    Basophils 0.40 0.00 - 1.80 %    Immature Granulocytes 0.90 0.00 - 0.90 %    Nucleated RBC 0.00 /100 WBC    Neutrophils (Absolute) 5.34 2.00 - 7.15 K/uL    Lymphs (Absolute) 1.71 1.00 - 4.80 K/uL    Monos (Absolute) 0.58 0.00 - 0.85 K/uL    Eos (Absolute) 0.10 0.00 - 0.51 K/uL    Baso (Absolute) 0.03 0.00 - 0.12 K/uL    Immature Granulocytes (abs) 0.07 0.00 - 0.11 K/uL    NRBC (Absolute) 0.00 K/uL   COMP METABOLIC PANEL    Collection Time: 08/21/22  1:40 PM   Result Value Ref Range    Sodium 138 135 - 145 mmol/L    Potassium 3.8 3.6 - 5.5 mmol/L    Chloride 108 96 - 112 mmol/L    Co2 16 (L) 20 - 33 mmol/L    Anion Gap 14.0 7.0 - 16.0    Glucose 81 65 - 99 mg/dL    Bun 8 8 - 22 mg/dL    Creatinine 0.50 0.50 - 1.40 mg/dL    Calcium 8.6 8.5 - 10.5 mg/dL    AST(SGOT) 16 12 - 45 U/L    ALT(SGPT) 12 2 - 50 U/L    Alkaline Phosphatase 130 (H) 30 - 99 U/L    Total Bilirubin 0.4 0.1 - 1.5 mg/dL    Albumin 3.5 3.2 - 4.9 g/dL    Total Protein 6.6 6.0 - 8.2 g/dL    Globulin 3.1 1.9 - 3.5 g/dL    A-G Ratio 1.1 g/dL   ESTIMATED GFR    Collection Time: 08/21/22  1:40 PM   Result Value Ref Range    GFR (CKD-EPI) 131 >60 mL/min/1.73 m 2   Hold Blood Bank Specimen (Not Tested)    Collection Time: 08/24/22  1:10 PM   Result Value Ref Range    Holding Tube - Bb DONE    CBC with differential    Collection Time: 08/24/22  1:10 PM   Result Value Ref Range    WBC 10.5 4.8 - 10.8 K/uL    RBC 4.51 4.20 - 5.40 M/uL    Hemoglobin 12.3 12.0 - 16.0 g/dL    Hematocrit 37.5 37.0 - 47.0 %    MCV 83.1 81.4 - 97.8 fL    MCH 27.3  27.0 - 33.0 pg    MCHC 32.8 (L) 33.6 - 35.0 g/dL    RDW 39.8 35.9 - 50.0 fL    Platelet Count 228 164 - 446 K/uL    MPV 12.5 9.0 - 12.9 fL    Neutrophils-Polys 71.80 44.00 - 72.00 %    Lymphocytes 17.30 (L) 22.00 - 41.00 %    Monocytes 8.50 0.00 - 13.40 %    Eosinophils 0.60 0.00 - 6.90 %    Basophils 0.50 0.00 - 1.80 %    Immature Granulocytes 1.30 (H) 0.00 - 0.90 %    Nucleated RBC 0.00 /100 WBC    Neutrophils (Absolute) 7.56 (H) 2.00 - 7.15 K/uL    Lymphs (Absolute) 1.82 1.00 - 4.80 K/uL    Monos (Absolute) 0.90 (H) 0.00 - 0.85 K/uL    Eos (Absolute) 0.06 0.00 - 0.51 K/uL    Baso (Absolute) 0.05 0.00 - 0.12 K/uL    Immature Granulocytes (abs) 0.14 (H) 0.00 - 0.11 K/uL    NRBC (Absolute) 0.00 K/uL        Assessment:   Josie Prajapati at 37w0d  Labor status: Early latent labor.  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      Plan:     Admit to L&D  GBS negative  Start Pitocin 2x2  Epidural for pain mgt  Anticipate     Baby has VSD on the US. Will need NB evaluation     Gayle Araujo MD

## 2022-08-25 NOTE — CARE PLAN
The patient is stable    Shift Goals  Clinical Goals: Healthy mom heathy baby    Progress made toward(s) clinical / shift goals:  healthy mom, healthy baby    Patient is not progressing towards the following goals:

## 2022-08-25 NOTE — PROGRESS NOTES
1915 - Assumed care of pt. Report received from Kelly LEI. Plan is for pt to eat dinner, receive scheduled medications that she takes for bells palsy, and then begin pitocin after she eats dinner.   2107 - Pitocin began for IOL.   0000 - Pt feeling more uncomfortable. SVE: 3/70/-3.   0115 - Report to Nany LEI.

## 2022-08-25 NOTE — L&D DELIVERY NOTE
DELIVERY NOTE    Date of Delivery: 2022    Primary OB: Gayle Araujo MD    27  year old  37 weeks, IUGR, IOL  GBS negative  Pitocin was started for induction/augmentation.   She was SROM'ed at 0955 that was bloody  She progressed uneventfully in labor and delivered via  at 1034 over intact perineum under epidural anesthesia  to a live born baby boy in direct OP with BW 2810 gm AS .  Baby was placed skin to skin, good suctioning of the nose and mouth was done, delayed cord clamping was done and cut by FOB (Nehemias) and baby handed off to the RN in attendance of further care.   Bloody AF.  Placenta was delivered spontaneously at 1045 complete and intact with 3 vessel cord. Gross examination of the placenta showed marginal abruptio about 5% and 100 cc retroplacental clot   Estimated Blood loss- 150 cc  Uterus noted to be firm and contracted immediately postpartum.  No other cervical nor vaginal lacerations noted.  Patient tolerated the procedure well. No complications encountered.  Both patient and baby are in good condition.     Sponge count was correct x 2. Vaginal exam at end of the procedure revealed no sponges no foreign body in the vaginal vault.    Gayle Araujo MD

## 2022-08-25 NOTE — ANESTHESIA TIME REPORT
Anesthesia Start and Stop Event Times     Date Time Event    8/25/2022 0820 Ready for Procedure     0855 Anesthesia Start     1034 Anesthesia Stop        Responsible Staff  08/25/22    Name Role Begin End    Willi Obrien D.O. Anesth 0855 1034        Overtime Reason:  no overtime (within assigned shift)    Comments:

## 2022-08-25 NOTE — ANESTHESIA POSTPROCEDURE EVALUATION
Patient: Josie Prajapati    Procedure Summary     Date: 08/25/22 Room / Location:     Anesthesia Start: 0855 Anesthesia Stop: 1034    Procedure: Labor Epidural Diagnosis:     Scheduled Providers:  Responsible Provider: Willi Obrien D.O.    Anesthesia Type: epidural ASA Status: 2          Final Anesthesia Type: epidural  Last vitals  BP   Blood Pressure: 128/77    Temp   36.1 °C (97 °F)    Pulse   (!) 112   Resp   18    SpO2   95 %      Anesthesia Post Evaluation    Patient location during evaluation: floor  Patient participation: complete - patient participated  Level of consciousness: awake and alert  Pain score: 0    Airway patency: patent  Anesthetic complications: no  Cardiovascular status: hemodynamically stable  Respiratory status: acceptable  Hydration status: euvolemic    PONV: none          No notable events documented.     Nurse Pain Score: 0 (NPRS)

## 2022-08-25 NOTE — PROGRESS NOTES
Pt arrived to unit at 1304 via wheelchair.  VSS.  Assessment complete. No signs of distress noted at this time.  Pt denies pain. Pt oriented to unit routine and call light system.  Pt denies any additional needs at this time.  Call light within reach. Will continue to monitor. Pitocin infusing at 125mL/hr. Infant L2 in NBN.

## 2022-08-25 NOTE — LACTATION NOTE
This note was copied from a baby's chart.  Initial Consult      at 37+1weeks.  IOL for IUGR.  Delivery complicated with Placental abruption, infnat to NBN for first 4HOL.    Infant had just returned to room at time of consult, suggested t place skin to skin while discussing basic breastfeeding information.  Infant rooting towards hands.  Assisted into football hold and expressed multiple drops of BM onto lips.  Leake noted, but no latch achieved.  Returned to skin to skin.      Basic breastfeeding information education given to include feeding baby with feeding cues and at least a minimum of 8x/24 hours.  It is not recommended to let baby sleep longer than 4 hours between feedings and if sleepy, place skin to skin to promote feeding interest and milk production.   Expect cluster feeding as this is normal during early days of life and growth spurts. Discussed recognition of early feeding cues and importance of deep latch. It is not recommended to use pacifiers or bottle supplementation with formula until breast feeding and milk production is well established or at least 4 weeks.     Taught hand expression, return demo given.      Expect breast changes as breastmilk increases in volume.  Frequent feedings as well as looking for transitioning stools from dark meconium to bright yellow/green seedy loose stools by day 5.     [facility] Breastfeeding Resources given to BRANDY

## 2022-08-25 NOTE — PROGRESS NOTES
0115 Report received from QUIQUE Molina. POC discussed, questions answered.     0700 Report given to QUIQUE Ayala. POC discussed, questions answered.

## 2022-08-25 NOTE — CARE PLAN
The patient is Stable - Low risk of patient condition declining or worsening    Shift Goals  Clinical Goals: VSS, refugioa WDL, bond  Patient Goals: healthy baby  Family Goals: support    Progress made toward(s) clinical / shift goals:  rest    Patient is not progressing towards the following goals: N/A

## 2022-08-25 NOTE — PROGRESS NOTES
L&D Progress Note    Name:   Josie Prajapati   Date/Time:  8/25/2022 8:04 AM  Gestational Age:  37w1d  Admit Date:   8/24/2022  Admitting Dx:   Labor and delivery indication for care or intervention [O75.9]    Subjective:  Uterine contractions: yes  Pain:    yes  Complaints:   yes       Objective:   Vitals:    08/24/22 1451 08/24/22 1452 08/24/22 1500   BP: 126/67     Pulse: (!) 110 (!) 107 (!) 120   Resp: 18     Temp: 36.6 °C (97.9 °F)     TempSrc: Temporal     SpO2: 97% 97%    Weight: 74.4 kg (164 lb)     Height: 1.524 m (5')       Fetal heart variability: moderate  Cervical: 75% ;  Dilatation .4; Station negative3    Fetal position:   Cephalic  Membranes ruptured: no     Meds:   Epidural : .  no    Pitocin: .  yes    Labs:  Recent Results (from the past 72 hour(s))   Hold Blood Bank Specimen (Not Tested)    Collection Time: 08/24/22  1:10 PM   Result Value Ref Range    Holding Tube - Bb DONE    CBC with differential    Collection Time: 08/24/22  1:10 PM   Result Value Ref Range    WBC 10.5 4.8 - 10.8 K/uL    RBC 4.51 4.20 - 5.40 M/uL    Hemoglobin 12.3 12.0 - 16.0 g/dL    Hematocrit 37.5 37.0 - 47.0 %    MCV 83.1 81.4 - 97.8 fL    MCH 27.3 27.0 - 33.0 pg    MCHC 32.8 (L) 33.6 - 35.0 g/dL    RDW 39.8 35.9 - 50.0 fL    Platelet Count 228 164 - 446 K/uL    MPV 12.5 9.0 - 12.9 fL    Neutrophils-Polys 71.80 44.00 - 72.00 %    Lymphocytes 17.30 (L) 22.00 - 41.00 %    Monocytes 8.50 0.00 - 13.40 %    Eosinophils 0.60 0.00 - 6.90 %    Basophils 0.50 0.00 - 1.80 %    Immature Granulocytes 1.30 (H) 0.00 - 0.90 %    Nucleated RBC 0.00 /100 WBC    Neutrophils (Absolute) 7.56 (H) 2.00 - 7.15 K/uL    Lymphs (Absolute) 1.82 1.00 - 4.80 K/uL    Monos (Absolute) 0.90 (H) 0.00 - 0.85 K/uL    Eos (Absolute) 0.06 0.00 - 0.51 K/uL    Baso (Absolute) 0.05 0.00 - 0.12 K/uL    Immature Granulocytes (abs) 0.14 (H) 0.00 - 0.11 K/uL    NRBC (Absolute) 0.00 K/uL         Assessment:   Gestational Age:   37w1d  Risk  Factors:   IUGR  Titrate Pitocin  Epidural for pain mgt  Anticipate     Patient Active Problem List    Diagnosis Date Noted    Initiation of OCP (BCP) 10/06/2021    Left wrist pain 2021    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

## 2022-08-25 NOTE — PROGRESS NOTES
L&D Progress Note    Name:   Josie Prajapati   Date/Time:  2022 10: 00 AM  Gestational Age:  37w1d  Admit Date:   2022  Admitting Dx:   Labor and delivery indication for care or intervention [O75.9]  IUGR  Subjective:  (+) uncomfortable contractions   Just got her epidural     Objective:   Vitals:    22 1001 22 1005 22 1009 22 1010   BP:       Pulse: (!) 131 (!) 130 (!) 131 (!) 129   Resp:       Temp:       TempSrc:       SpO2:  97%  97%   Weight:       Height:         Cat I  [place] q 2-3    SROM - bloody at 9:55 AM    Meds:   Epidural : .  yes  Magnesium sulfate: . N\A  Pitocin: .  yes    Labs:  Recent Results (from the past 72 hour(s))   Hold Blood Bank Specimen (Not Tested)    Collection Time: 22  1:10 PM   Result Value Ref Range    Holding Tube - Bb DONE    CBC with differential    Collection Time: 22  1:10 PM   Result Value Ref Range    WBC 10.5 4.8 - 10.8 K/uL    RBC 4.51 4.20 - 5.40 M/uL    Hemoglobin 12.3 12.0 - 16.0 g/dL    Hematocrit 37.5 37.0 - 47.0 %    MCV 83.1 81.4 - 97.8 fL    MCH 27.3 27.0 - 33.0 pg    MCHC 32.8 (L) 33.6 - 35.0 g/dL    RDW 39.8 35.9 - 50.0 fL    Platelet Count 228 164 - 446 K/uL    MPV 12.5 9.0 - 12.9 fL    Neutrophils-Polys 71.80 44.00 - 72.00 %    Lymphocytes 17.30 (L) 22.00 - 41.00 %    Monocytes 8.50 0.00 - 13.40 %    Eosinophils 0.60 0.00 - 6.90 %    Basophils 0.50 0.00 - 1.80 %    Immature Granulocytes 1.30 (H) 0.00 - 0.90 %    Nucleated RBC 0.00 /100 WBC    Neutrophils (Absolute) 7.56 (H) 2.00 - 7.15 K/uL    Lymphs (Absolute) 1.82 1.00 - 4.80 K/uL    Monos (Absolute) 0.90 (H) 0.00 - 0.85 K/uL    Eos (Absolute) 0.06 0.00 - 0.51 K/uL    Baso (Absolute) 0.05 0.00 - 0.12 K/uL    Immature Granulocytes (abs) 0.14 (H) 0.00 - 0.11 K/uL    NRBC (Absolute) 0.00 K/uL         Assessment:   Gestational Age:   37w1d  Await     Patient Active Problem List    Diagnosis Date Noted    Initiation of OCP (BCP) 10/06/2021    Left wrist pain  08/11/2021    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

## 2022-08-25 NOTE — ANESTHESIA PROCEDURE NOTES
Epidural Block    Date/Time: 8/25/2022 8:58 AM  Performed by: Willi Obrien D.O.  Authorized by: Willi Obrien D.O.     Patient Location:  OB  Start Time:  8/25/2022 8:58 AM  End Time:  8/25/2022 9:04 AM  Reason for Block: labor analgesia    patient identified, IV checked, site marked, risks and benefits discussed, surgical consent, monitors and equipment checked, pre-op evaluation and timeout performed    Patient Position:  Sitting  Prep: ChloraPrep, patient draped and sterile technique    Monitoring:  Blood pressure, continuous pulse oximetry and heart rate  Approach:  Midline  Location:  L3-L4  Injection Technique:  BARBARA air  Skin infiltration:  Lidocaine  Strength:  1%  Dose:  3ml  Needle Type:  Tuohy  Needle Gauge:  17 G  Needle Length:  3.5 in  Loss of resistance::  5.5  Catheter Size:  19 G  Catheter at Skin Depth:  10  Test Dose Result:  Negative

## 2022-08-26 ENCOUNTER — PHARMACY VISIT (OUTPATIENT)
Dept: PHARMACY | Facility: MEDICAL CENTER | Age: 27
End: 2022-08-26
Payer: COMMERCIAL

## 2022-08-26 VITALS
BODY MASS INDEX: 32.2 KG/M2 | HEIGHT: 60 IN | HEART RATE: 75 BPM | RESPIRATION RATE: 18 BRPM | TEMPERATURE: 97.6 F | OXYGEN SATURATION: 97 % | SYSTOLIC BLOOD PRESSURE: 107 MMHG | DIASTOLIC BLOOD PRESSURE: 81 MMHG | WEIGHT: 164 LBS

## 2022-08-26 LAB
ERYTHROCYTE [DISTWIDTH] IN BLOOD BY AUTOMATED COUNT: 39.8 FL (ref 35.9–50)
HCT VFR BLD AUTO: 35.4 % (ref 37–47)
HGB BLD-MCNC: 11.6 G/DL (ref 12–16)
MCH RBC QN AUTO: 26.9 PG (ref 27–33)
MCHC RBC AUTO-ENTMCNC: 32.8 G/DL (ref 33.6–35)
MCV RBC AUTO: 82.1 FL (ref 81.4–97.8)
PLATELET # BLD AUTO: 227 K/UL (ref 164–446)
PMV BLD AUTO: 12.2 FL (ref 9–12.9)
RBC # BLD AUTO: 4.31 M/UL (ref 4.2–5.4)
WBC # BLD AUTO: 11.2 K/UL (ref 4.8–10.8)

## 2022-08-26 PROCEDURE — 700102 HCHG RX REV CODE 250 W/ 637 OVERRIDE(OP): Performed by: OBSTETRICS & GYNECOLOGY

## 2022-08-26 PROCEDURE — RXMED WILLOW AMBULATORY MEDICATION CHARGE: Performed by: OBSTETRICS & GYNECOLOGY

## 2022-08-26 PROCEDURE — A9270 NON-COVERED ITEM OR SERVICE: HCPCS | Performed by: OBSTETRICS & GYNECOLOGY

## 2022-08-26 PROCEDURE — 85027 COMPLETE CBC AUTOMATED: CPT

## 2022-08-26 PROCEDURE — 36415 COLL VENOUS BLD VENIPUNCTURE: CPT

## 2022-08-26 RX ORDER — IBUPROFEN 800 MG/1
800 TABLET ORAL EVERY 8 HOURS PRN
Qty: 30 TABLET | Refills: 3 | Status: SHIPPED | OUTPATIENT
Start: 2022-08-26 | End: 2022-11-16 | Stop reason: SDUPTHER

## 2022-08-26 RX ADMIN — FAMCICLOVIR 500 MG: 500 TABLET, FILM COATED ORAL at 07:33

## 2022-08-26 RX ADMIN — METHYLPREDNISOLONE 4 MG: 4 TABLET ORAL at 07:34

## 2022-08-26 RX ADMIN — IBUPROFEN 800 MG: 800 TABLET, FILM COATED ORAL at 04:54

## 2022-08-26 RX ADMIN — FAMCICLOVIR 500 MG: 500 TABLET, FILM COATED ORAL at 12:40

## 2022-08-26 RX ADMIN — IBUPROFEN 800 MG: 800 TABLET, FILM COATED ORAL at 14:53

## 2022-08-26 ASSESSMENT — EDINBURGH POSTNATAL DEPRESSION SCALE (EPDS)
THINGS HAVE BEEN GETTING ON TOP OF ME: NO, MOST OF THE TIME I HAVE COPED QUITE WELL
I HAVE LOOKED FORWARD WITH ENJOYMENT TO THINGS: RATHER LESS THAN I USED TO
I HAVE FELT SAD OR MISERABLE: NOT VERY OFTEN
I HAVE BEEN ABLE TO LAUGH AND SEE THE FUNNY SIDE OF THINGS: NOT QUITE SO MUCH NOW
I HAVE BLAMED MYSELF UNNECESSARILY WHEN THINGS WENT WRONG: NO, NEVER
THE THOUGHT OF HARMING MYSELF HAS OCCURRED TO ME: NEVER
I HAVE BEEN ANXIOUS OR WORRIED FOR NO GOOD REASON: YES, VERY OFTEN
I HAVE BEEN SO UNHAPPY THAT I HAVE HAD DIFFICULTY SLEEPING: NOT VERY OFTEN
I HAVE BEEN SO UNHAPPY THAT I HAVE BEEN CRYING: ONLY OCCASIONALLY
I HAVE FELT SCARED OR PANICKY FOR NO GOOD REASON: NO, NOT AT ALL

## 2022-08-26 ASSESSMENT — PAIN DESCRIPTION - PAIN TYPE: TYPE: ACUTE PAIN

## 2022-08-26 NOTE — CARE PLAN
The patient is Stable - Low risk of patient condition declining or worsening    Shift Goals  Clinical Goals: clinically stable  Patient Goals: healthy baby  Family Goals: support    Progress made toward(s) clinical / shift goals:  patient is clinically stable    Patient is not progressing towards the following goals:

## 2022-08-26 NOTE — DISCHARGE SUMMARY
Discharge Summary:      Josie Prajapati    Admit Date:   2022  Discharge Date:  2022     Admitting diagnosis:  Labor and delivery indication for care or intervention [O75.9]  Discharge Diagnosis: Status post vaginal, spontaneous.  Pregnancy Complications: IUGR  Tubal Ligation:  no        History:  Past Medical History:   Diagnosis Date    Anxiety     Facial paralysis/Seward palsy 2022     OB History    Para Term  AB Living   5 3 3 0 1 3   SAB IAB Ectopic Molar Multiple Live Births   0 0 0 0 0 3      # Outcome Date GA Lbr Haseeb/2nd Weight Sex Delivery Anes PTL Lv   5 Term 22 37w1d 02:55 / 00:09 2.81 kg (6 lb 3.1 oz) M Vag-Spont EPI N LEANDRA      Complications: Abruptio Placenta   4 AB            3 Term            2 Term         LEANDRA   1          LEANDRA        Patient has no known allergies.  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 , now para 3, was admitted with the above mentioned diagnosis, underwent Induction of Labor, vaginal, spontaneous. Patient postpartum course was unremarkable, with progressive advancement in diet , ambulation and toleration of oral analgesia. Patient without complaints today and desires discharge.      Vitals:    22 1100 22 1324 22 1800 22 0600   BP: 128/77 103/66 114/71 107/81   Pulse: (!) 112 (!) 108 92 75   Resp:  20 18 18   Temp:  36.6 °C (97.8 °F) 36.6 °C (97.9 °F) 36.4 °C (97.6 °F)   TempSrc:  Temporal Temporal Temporal   SpO2: 95% 97% 94% 97%   Weight:       Height:           Current Facility-Administered Medications   Medication Dose    lactated ringers infusion      docusate sodium (COLACE) capsule 100 mg  100 mg    ibuprofen  (MOTRIN) tablet 800 mg  800 mg    acetaminophen (TYLENOL) tablet 1,000 mg  1,000 mg    tetanus-dipth-acell pertussis (Tdap) inj 0.5 mL  0.5 mL    measles, mumps and rubella vaccine (MMR) injection 0.5 mL  0.5 mL    famciclovir (FAMVIR) tablet 500 mg  500 mg    methylPREDNISolone (MEDROL) 4 mg tablet  4 mg    Followed by    [START ON 8/27/2022] methylPREDNISolone (MEDROL) 4 mg tablet  4 mg       Exam:  Breast Exam: negative  Abdomen: Abdomen soft, non-tender. BS normal. No masses,  No organomegaly  Fundus Non Tender: yes  Incision: none  Perineum: perineum intact  Extremity: extremities, peripheral pulses and reflexes normal     Labs:  Recent Labs     08/24/22  1310 08/26/22  0331   WBC 10.5 11.2*   RBC 4.51 4.31   HEMOGLOBIN 12.3 11.6*   HEMATOCRIT 37.5 35.4*   MCV 83.1 82.1   MCH 27.3 26.9*   MCHC 32.8* 32.8*   RDW 39.8 39.8   PLATELETCT 228 227   MPV 12.5 12.2        Activity:   Discharge to home  Pelvic Rest x 6 weeks    Assessment:  normal postpartum course  Discharge Assessment: Taking adequate diet and fluids     Follow up: .ANNABELLA REYNOLDS MD 6 WEEKS   Discharge Meds:   Current Outpatient Medications   Medication Sig Dispense Refill    ibuprofen (MOTRIN) 800 MG Tab Take 1 Tablet by mouth every 8 hours as needed for Moderate Pain. 30 Tablet 3       Gayle Arechiga M.D.

## 2022-08-26 NOTE — PROGRESS NOTES
Post Partum Progress Note    Name:   Josie Prajapati   Date/Time:  8/26/2022 - 11:02 AM  Chief Admitting Dx:  Labor and delivery indication for care or intervention [O75.9]  Delivery Type:  vaginal, spontaneous  Post-Op/Post Partum Days #:  1  IUGR    Subjective:  Abdominal pain: no  Ambulating:   yes  Tolerating liquids:  yes  Tolerating food:  yes common adult  Flatus:   no  BM:    no  Bleeding:   with a small amount of bleeding  Voiding:   yes  Dizziness:   no  Feeding:   breast    Vitals:    08/25/22 1100 08/25/22 1324 08/25/22 1800 08/26/22 0600   BP: 128/77 103/66 114/71 107/81   Pulse: (!) 112 (!) 108 92 75   Resp:  20 18 18   Temp:  36.6 °C (97.8 °F) 36.6 °C (97.9 °F) 36.4 °C (97.6 °F)   TempSrc:  Temporal Temporal Temporal   SpO2: 95% 97% 94% 97%   Weight:       Height:           Exam:  Breast: Tenderness no  Abdomen: Abdomen soft, non-tender. BS normal. No masses,  No organomegaly  Fundal Tenderness:  no  Fundus Firm: yes  Incision: none  Below umbilicus: yes  Perineum: perineum intact  Lochia: mild  Extremities: Normal extremities, peripheral pulses and reflexes normal    Meds:  Current Facility-Administered Medications   Medication Dose    lactated ringers infusion      docusate sodium (COLACE) capsule 100 mg  100 mg    ibuprofen (MOTRIN) tablet 800 mg  800 mg    acetaminophen (TYLENOL) tablet 1,000 mg  1,000 mg    tetanus-dipth-acell pertussis (Tdap) inj 0.5 mL  0.5 mL    measles, mumps and rubella vaccine (MMR) injection 0.5 mL  0.5 mL    famciclovir (FAMVIR) tablet 500 mg  500 mg    methylPREDNISolone (MEDROL) 4 mg tablet  4 mg    Followed by    [START ON 8/27/2022] methylPREDNISolone (MEDROL) 4 mg tablet  4 mg       Labs:   Recent Labs     08/24/22  1310 08/26/22  0331   WBC 10.5 11.2*   RBC 4.51 4.31   HEMOGLOBIN 12.3 11.6*   HEMATOCRIT 37.5 35.4*   MCV 83.1 82.1   MCH 27.3 26.9*   MCHC 32.8* 32.8*   RDW 39.8 39.8   PLATELETCT 228 227   MPV 12.5 12.2       Assessment:  Chief Admitting Dx:   Labor and delivery indication for care or intervention [O75.9]  Delivery Type:  vaginal, spontaneous  Tubal Ligation:  no    Plan:  Continue routine post partum care.  Ambulate   Continue to breast feed    Gayle Arechiga M.D.

## 2022-08-26 NOTE — DISCHARGE INSTRUCTIONS

## 2022-08-26 NOTE — DISCHARGE PLANNING
Meds-to-Beds: Discharge prescription order listed below delivered to patient's bedside. RN Lashonda notified. Patient counseled.      Current Outpatient Medications   Medication Sig Dispense Refill    ibuprofen (MOTRIN) 800 MG Tab Take 1 Tablet by mouth every 8 hours as needed for Moderate Pain. 30 Tablet 3      Ariana Martino, PharmD

## 2022-08-26 NOTE — DISCHARGE PLANNING
Discharge Planning Assessment Post Partum     Reason for Referral: History of depression and anxiety  Address: [address] [place] [address]  Phone: [phone]  Type of Living Situation: living with FOB and children  Mom Diagnosis: Pregnancy  Baby Diagnosis: -37.1 weeks  Primary Language: Albanian and English     Name of Baby: Still deciding on a name for infant (: 22)  Father of the Baby: Nehemias Killian   Involved in baby’s care? Yes  Contact Information: [phone]     Prenatal Care: Yes-Dr. Morillo  Mom's PCP: Dr. Michelle Smith  PCP for new baby: [facility]     Support System: FOB  Coping/Bonding between mother & baby: Yes  Source of Feeding: breast feeding  Supplies for Infant: prepared for infant; denies any needs     Mom's Insurance: [place] Medicaid  Baby Covered on Insurance:Yes  Mother Employed/School: [employer]  Other children in the home/names & ages: parents have two other children     Financial Hardship/Income: No   Mom's Mental status: alert and oriented  Services used prior to admit: Medicaid      CPS History: No  Psychiatric History: history of depression and anxiety.  MOB scored a 9 on the EPDS screen.  Discussed with mother and provided resources.  Domestic Violence History: No  Drug/ETOH History: No     Resources Provided: pediatrician list, children and family resource list, post partum support and counseling resources, and a list of [facility] clinics provided to parents   Referrals Made: diaper bank referral provided      Clearance for Discharge: Infant is cleared to discharge home with parents once medically cleared

## 2022-08-26 NOTE — CARE PLAN
Problem: Pain - Standard  Goal: Alleviation of pain or a reduction in pain to the patient’s comfort goal  Outcome: Progressing   The patient is Stable - Low risk of patient condition declining or worsening    Shift Goals  Clinical Goals: VSS, lochia WDL, bond  Patient Goals: healthy baby  Family Goals: support    Progress made toward(s) clinical / shift goals:  Pt medicated for pain x1. PT states feeling stronger and ready to go home     Patient is not progressing towards the following goals:

## 2022-10-27 ENCOUNTER — NON-PROVIDER VISIT (OUTPATIENT)
Dept: MEDICAL GROUP | Facility: MEDICAL CENTER | Age: 27
End: 2022-10-27
Attending: INTERNAL MEDICINE
Payer: MEDICAID

## 2022-10-27 DIAGNOSIS — Z23 NEED FOR VACCINATION: ICD-10-CM

## 2022-10-27 PROCEDURE — 90471 IMMUNIZATION ADMIN: CPT

## 2022-10-27 NOTE — NON-PROVIDER
"Josie Prajapati is a 27 y.o. female here for a non-provider visit for:   FLU    Reason for immunization: Annual Flu Vaccine  Immunization records indicate need for vaccine: Yes, confirmed with Epic  Minimum interval has been met for this vaccine: Yes  ABN completed: Yes    VIS Dated    was given to patient: Yes  All IAC Questionnaire questions were answered \"No.\"    Patient tolerated injection and no adverse effects were observed or reported: Yes    Pt scheduled for next dose in series: no  " From: Camden Iyer  To: Quinten Millard  Sent: 3/3/2021 8:19 AM CST  Subject: After-Visit Question    Checking in to see if the tests results from Tuesday (2.23) and the Holter monitor results are available.  Thank you

## 2022-11-11 RX ORDER — ESCITALOPRAM OXALATE 10 MG/1
10 TABLET ORAL
COMMUNITY
Start: 2022-10-06

## 2022-11-16 ENCOUNTER — PATIENT MESSAGE (OUTPATIENT)
Dept: MEDICAL GROUP | Facility: MEDICAL CENTER | Age: 27
End: 2022-11-16

## 2022-11-16 ENCOUNTER — OFFICE VISIT (OUTPATIENT)
Dept: MEDICAL GROUP | Facility: MEDICAL CENTER | Age: 27
End: 2022-11-16
Attending: INTERNAL MEDICINE
Payer: MEDICAID

## 2022-11-16 VITALS
TEMPERATURE: 97.8 F | DIASTOLIC BLOOD PRESSURE: 78 MMHG | HEIGHT: 60 IN | OXYGEN SATURATION: 100 % | HEART RATE: 74 BPM | BODY MASS INDEX: 28.47 KG/M2 | RESPIRATION RATE: 16 BRPM | SYSTOLIC BLOOD PRESSURE: 122 MMHG | WEIGHT: 145 LBS

## 2022-11-16 DIAGNOSIS — K52.9 CHRONIC DIARRHEA: ICD-10-CM

## 2022-11-16 DIAGNOSIS — J31.0 CHRONIC RHINITIS: ICD-10-CM

## 2022-11-16 DIAGNOSIS — Z86.69 HISTORY OF BELL'S PALSY: ICD-10-CM

## 2022-11-16 DIAGNOSIS — H53.8 BLURRED VISION: ICD-10-CM

## 2022-11-16 DIAGNOSIS — G89.29 CHRONIC BILATERAL LOW BACK PAIN, UNSPECIFIED WHETHER SCIATICA PRESENT: ICD-10-CM

## 2022-11-16 DIAGNOSIS — R20.0 NUMBNESS AND TINGLING OF LEG: ICD-10-CM

## 2022-11-16 DIAGNOSIS — R20.2 NUMBNESS AND TINGLING OF LEG: ICD-10-CM

## 2022-11-16 DIAGNOSIS — M54.50 CHRONIC BILATERAL LOW BACK PAIN, UNSPECIFIED WHETHER SCIATICA PRESENT: ICD-10-CM

## 2022-11-16 PROBLEM — M93.1: Status: RESOLVED | Noted: 2021-08-11 | Resolved: 2022-11-16

## 2022-11-16 PROBLEM — M25.532 LEFT WRIST PAIN: Status: RESOLVED | Noted: 2021-08-11 | Resolved: 2022-11-16

## 2022-11-16 PROCEDURE — 99212 OFFICE O/P EST SF 10 MIN: CPT | Performed by: INTERNAL MEDICINE

## 2022-11-16 PROCEDURE — 99214 OFFICE O/P EST MOD 30 MIN: CPT | Performed by: INTERNAL MEDICINE

## 2022-11-16 RX ORDER — LOPERAMIDE HYDROCHLORIDE 2 MG/1
2 CAPSULE ORAL 4 TIMES DAILY PRN
Qty: 30 CAPSULE | Refills: 3 | Status: SHIPPED | OUTPATIENT
Start: 2022-11-16 | End: 2023-02-15 | Stop reason: SDUPTHER

## 2022-11-16 RX ORDER — FLUTICASONE PROPIONATE 50 MCG
1-2 SPRAY, SUSPENSION (ML) NASAL DAILY
Qty: 16 G | Refills: 5 | Status: SHIPPED | OUTPATIENT
Start: 2022-11-16 | End: 2022-11-16 | Stop reason: SDUPTHER

## 2022-11-16 RX ORDER — IBUPROFEN 800 MG/1
800 TABLET ORAL EVERY 8 HOURS PRN
Qty: 30 TABLET | Refills: 3 | Status: SHIPPED | OUTPATIENT
Start: 2022-11-16 | End: 2023-06-01 | Stop reason: SDUPTHER

## 2022-11-16 ASSESSMENT — FIBROSIS 4 INDEX: FIB4 SCORE: 0.57

## 2022-11-16 NOTE — ASSESSMENT & PLAN NOTE
Reports that since she gave birth about 3 months ago, she has had intermittent issues with her vision.  States that both eyes will seem blurred off and on and this tends to be associated with some right-sided neck pain as well as headaches.  She also reports some lightheadedness.  States that symptoms can last for few minutes to an hour or more.  Denies any aggravating or alleviating factors.  Wears glasses and is about due for her yearly vision check.

## 2022-11-16 NOTE — ASSESSMENT & PLAN NOTE
Reports that since she gave birth to her son 3 months ago, she has had intermittent episodes of numbness and tingling in her right leg as well as burning in both of her feet.  States that it occurs about every other week.  She has had 3 instances where all of a sudden she was unable to feel her right leg or move it.  This has happened when she has been carrying her infant and likely she was able to put him down and make it to her bed to lay down until the symptoms passed.  She reports that when she wakes up at night to check on her children she will often have severe burning in her feet and she usually applies some rubbing alcohol to try to help with this.  Symptoms dissipate during the day and she does not usually notice it.  She is also been having some low back pain associated with this.  She wonders if it is related to the epidural that she had with her son although she states that this procedure was uncomplicated.

## 2022-11-17 PROBLEM — G89.29 CHRONIC BILATERAL LOW BACK PAIN: Status: ACTIVE | Noted: 2022-11-17

## 2022-11-17 PROBLEM — M54.50 CHRONIC BILATERAL LOW BACK PAIN: Status: ACTIVE | Noted: 2022-11-17

## 2022-11-17 PROBLEM — Z86.69 HISTORY OF BELL'S PALSY: Status: ACTIVE | Noted: 2022-11-17

## 2022-11-17 RX ORDER — FLUTICASONE PROPIONATE 50 MCG
1-2 SPRAY, SUSPENSION (ML) NASAL DAILY
Qty: 16 G | Refills: 5 | Status: SHIPPED | OUTPATIENT
Start: 2022-11-17 | End: 2023-08-08 | Stop reason: SDUPTHER

## 2022-11-17 NOTE — PROGRESS NOTES
Subjective:   Josie Prajapati is a 27 y.o. female here today for numbness/tingling in leg, issues below    Numbness and tingling of leg  Reports that since she gave birth to her son 3 months ago, she has had intermittent episodes of numbness and tingling in her right leg as well as burning in both of her feet.  States that it occurs about every other week.  She has had 3 instances where all of a sudden she was unable to feel her right leg or move it.  This has happened when she has been carrying her infant and likely she was able to put him down and make it to her bed to lay down until the symptoms passed.  She reports that when she wakes up at night to check on her children she will often have severe burning in her feet and she usually applies some rubbing alcohol to try to help with this.  Symptoms dissipate during the day and she does not usually notice it.  She is also been having some low back pain associated with this.  She wonders if it is related to the epidural that she had with her son although she states that this procedure was uncomplicated.      Blurred vision  Reports that since she gave birth about 3 months ago, she has had intermittent issues with her vision.  States that both eyes will seem blurred off and on and this tends to be associated with some right-sided neck pain as well as headaches.  She also reports some lightheadedness.  States that symptoms can last for few minutes to an hour or more.  Denies any aggravating or alleviating factors.  Wears glasses and is about due for her yearly vision check.      Chronic diarrhea  She continues to complain of issues with diarrhea.  She is requesting a refill of her loperamide which has been helpful.  She would also like to reestablish with GI.  She states that she initially saw them but then stopped going to appointments because she became pregnant and has not been seen in a little under a year.    History of Bell's palsy  States that while  she was pregnant she experienced right-sided Bell's palsy.  Although she feels like the symptoms have mostly improved she still gets some burning and pain over the right side of her face at times she also reports changes in her speech.  States that sometimes her kids have trouble understanding her.    Chronic bilateral low back pain  Reports low back pain that occurs almost daily.  May or may not be associated with the numbness and tingling in her right leg.  Usually she is taking ibuprofen which helps and she is requesting a refill today.    Rhinitis  Reports increased nasal discharge and sinus pressure.  Is requesting a refill on her Flonase.       Current medicines (including changes today)  Current Outpatient Medications   Medication Sig Dispense Refill    fluticasone (FLONASE) 50 MCG/ACT nasal spray Administer 1-2 Sprays into affected nostril(S) every day. 16 g 5    loperamide (IMODIUM) 2 MG Cap Take 1 Capsule by mouth 4 times a day as needed for Diarrhea. 30 Capsule 3    ibuprofen (MOTRIN) 800 MG Tab Take 1 Tablet by mouth every 8 hours as needed for Moderate Pain. 30 Tablet 3    escitalopram (LEXAPRO) 10 MG Tab Take 1 Tablet by mouth every day.      famciclovir (FAMVIR) 500 MG Tab Take 1 Tablet by mouth 3 times a day. 21 Tablet 0    ondansetron (ZOFRAN ODT) 4 MG TABLET DISPERSIBLE Take 1 Tablet by mouth every 6 hours as needed for Nausea. 10 Tablet 0    Norethindrone Acet-Ethinyl Est (LOESTRIN 1.5/30, 21,) 1.5-30 MG-MCG Tab Take 1 Tablet by mouth every day. 28 Tablet 5    hydrOXYzine HCl (ATARAX) 25 MG Tab Take 1 Tab by mouth 3 times a day as needed for Anxiety. 30 Tab 3     No current facility-administered medications for this visit.     She  has a past medical history of Anxiety and Facial paralysis/Schoenchen palsy (08/20/2022).         Objective:     Vitals:    11/16/22 1545   BP: 122/78   Pulse: 74   Resp: 16   Temp: 36.6 °C (97.8 °F)   SpO2: 100%     Body mass index is 28.32 kg/m².   Physical  Exam:  Constitutional: Alert, no distress.  Skin: Warm, dry, good turgor, no rashes in visible areas.  Eye: Equal, round and reactive, conjunctiva clear, lids normal.  Respiratory: Unlabored respiratory effort, lungs clear to auscultation, no wheezes, no ronchi.  Cardiovascular: Regular rate and rhythm, no murmurs appreciated, no lower extremity edema  Psych: Alert and oriented x3, normal affect and mood.  Neuro: Cranial nerves II through XII grossly intact, 5/5 bilateral upper and lower extremity strength, normal gait.  Subjectively diminished sensation over left chin, right cheek, right upper thigh, and right calf otherwise sensation to light touch is intact bilaterally, 1+ patellar reflexes bilaterally, straight leg raise produces some tightness in the low back but no symptoms radiating down the leg    Assessment and Plan:   The following treatment plan was discussed    1. Numbness and tingling of leg  Unclear etiology.  Doubtful that this is related to her epidural although it occurred around the same time.  She could be experiencing some radiculopathy symptoms although she would be quite young to have significant spinal arthritis.  Regardless, we will obtain imaging of her low back since she is also having some associated back pain.  We also discussed that her symptoms are somewhat atypical for radiculopathy but could fit with multiple sclerosis.  She is also of the correct age and gender for an initial onset of this disease.  In conjunction with her vision changes, recent possible Bell's palsy, speech changes, and the burning pain in her feet I think it is reasonable to obtain a brain MRI to look for any demyelinating lesions as an initial first step.  - MR-BRAIN-W/O; Future  - DX-LUMBAR SPINE-2 OR 3 VIEWS; Future    2. Blurred vision  See discussion above.  Also encouraged her to follow-up with her optometrist to update her glasses prescription  - MR-BRAIN-W/O; Future    3. Chronic diarrhea  Improved with  loperamide.  Referral information given for GI and she will reach out to their office to make an appointment.  - loperamide (IMODIUM) 2 MG Cap; Take 1 Capsule by mouth 4 times a day as needed for Diarrhea.  Dispense: 30 Capsule; Refill: 3    4. Chronic bilateral low back pain, unspecified whether sciatica present  Will obtain imaging.  Ibuprofen has been refilled.  - ibuprofen (MOTRIN) 800 MG Tab; Take 1 Tablet by mouth every 8 hours as needed for Moderate Pain.  Dispense: 30 Tablet; Refill: 3  - DX-LUMBAR SPINE-2 OR 3 VIEWS; Future    5. Chronic rhinitis  - fluticasone (FLONASE) 50 MCG/ACT nasal spray; Administer 1-2 Sprays into affected nostril(S) every day.  Dispense: 16 g; Refill: 5    6. History of Bell's palsy  Unclear if this is significant in light of patient's new symptoms.  Seems to have resolved and we will continue to monitor.        Followup: Return if symptoms worsen or fail to improve.

## 2022-11-17 NOTE — ASSESSMENT & PLAN NOTE
States that while she was pregnant she experienced right-sided Bell's palsy.  Although she feels like the symptoms have mostly improved she still gets some burning and pain over the right side of her face at times she also reports changes in her speech.  States that sometimes her kids have trouble understanding her.

## 2022-11-17 NOTE — ASSESSMENT & PLAN NOTE
She continues to complain of issues with diarrhea.  She is requesting a refill of her loperamide which has been helpful.  She would also like to reestablish with GI.  She states that she initially saw them but then stopped going to appointments because she became pregnant and has not been seen in a little under a year.

## 2022-11-17 NOTE — ASSESSMENT & PLAN NOTE
Reports low back pain that occurs almost daily.  May or may not be associated with the numbness and tingling in her right leg.  Usually she is taking ibuprofen which helps and she is requesting a refill today.

## 2022-11-28 ENCOUNTER — HOSPITAL ENCOUNTER (OUTPATIENT)
Dept: RADIOLOGY | Facility: MEDICAL CENTER | Age: 27
End: 2022-11-28
Attending: INTERNAL MEDICINE
Payer: MEDICAID

## 2022-11-28 DIAGNOSIS — R20.2 NUMBNESS AND TINGLING OF LEG: ICD-10-CM

## 2022-11-28 DIAGNOSIS — H53.8 BLURRED VISION: ICD-10-CM

## 2022-11-28 DIAGNOSIS — R20.0 NUMBNESS AND TINGLING OF LEG: ICD-10-CM

## 2022-11-28 PROCEDURE — 70551 MRI BRAIN STEM W/O DYE: CPT

## 2022-11-30 ENCOUNTER — HOSPITAL ENCOUNTER (OUTPATIENT)
Dept: RADIOLOGY | Facility: MEDICAL CENTER | Age: 27
End: 2022-11-30
Attending: INTERNAL MEDICINE
Payer: MEDICAID

## 2022-11-30 DIAGNOSIS — G89.29 CHRONIC BILATERAL LOW BACK PAIN, UNSPECIFIED WHETHER SCIATICA PRESENT: ICD-10-CM

## 2022-11-30 DIAGNOSIS — M54.50 CHRONIC BILATERAL LOW BACK PAIN, UNSPECIFIED WHETHER SCIATICA PRESENT: ICD-10-CM

## 2022-11-30 PROCEDURE — 72100 X-RAY EXAM L-S SPINE 2/3 VWS: CPT

## 2022-12-28 ENCOUNTER — OFFICE VISIT (OUTPATIENT)
Dept: MEDICAL GROUP | Facility: MEDICAL CENTER | Age: 27
End: 2022-12-28
Attending: INTERNAL MEDICINE
Payer: MEDICAID

## 2022-12-28 VITALS
SYSTOLIC BLOOD PRESSURE: 104 MMHG | WEIGHT: 142 LBS | TEMPERATURE: 96.7 F | BODY MASS INDEX: 27.88 KG/M2 | HEART RATE: 80 BPM | RESPIRATION RATE: 14 BRPM | OXYGEN SATURATION: 95 % | DIASTOLIC BLOOD PRESSURE: 58 MMHG | HEIGHT: 60 IN

## 2022-12-28 DIAGNOSIS — G89.29 CHRONIC BILATERAL LOW BACK PAIN, UNSPECIFIED WHETHER SCIATICA PRESENT: ICD-10-CM

## 2022-12-28 DIAGNOSIS — G43.009 MIGRAINE WITHOUT AURA AND WITHOUT STATUS MIGRAINOSUS, NOT INTRACTABLE: ICD-10-CM

## 2022-12-28 DIAGNOSIS — M54.50 CHRONIC BILATERAL LOW BACK PAIN, UNSPECIFIED WHETHER SCIATICA PRESENT: ICD-10-CM

## 2022-12-28 PROCEDURE — 99214 OFFICE O/P EST MOD 30 MIN: CPT | Performed by: INTERNAL MEDICINE

## 2022-12-28 PROCEDURE — 99213 OFFICE O/P EST LOW 20 MIN: CPT | Performed by: INTERNAL MEDICINE

## 2022-12-28 RX ORDER — SUMATRIPTAN 50 MG/1
50-100 TABLET, FILM COATED ORAL
Qty: 9 TABLET | Refills: 3 | Status: SHIPPED | OUTPATIENT
Start: 2022-12-28 | End: 2023-02-16

## 2022-12-28 ASSESSMENT — FIBROSIS 4 INDEX: FIB4 SCORE: 0.57

## 2022-12-28 NOTE — ASSESSMENT & PLAN NOTE
She presents today for continued headaches.  She states that she is getting them about 4 times a week.  Usually they are on the right side and she describes them as throbbing but sometimes they affect both sides.  She has a diagnosis of migraine headaches when she was younger and they recently started bothering her again.  She reports associated nausea and she states that it is a little bit harder for her to her to think when she has a headache.  She still has to function and take care of her kids although occasionally they get so bad that she tries to get her kids to watch TV so she can go lay down in a dark room.  Right now, she is not taking any migraine specific medication.

## 2022-12-28 NOTE — ASSESSMENT & PLAN NOTE
She reports continued low back pain since she gave birth to her son 4 months ago.  We obtained an x-ray on 11/30 which was unremarkable.  She denies any recent injuries.  She states that it is especially painful if it is cold outside or if she is standing a lot or doing chores around the house like mopping.  Occasionally she will get some numbness but it is usually isolated to her right knee and calf.  She denies any shooting pain down the back of her leg.  She has been taking ibuprofen and using muscle rubs which help.  She states that a heating pad makes it worse.  She has never done any physical therapy for this or seen a spine specialist.

## 2022-12-28 NOTE — PROGRESS NOTES
Subjective:   Josie Prajapati is a 27 y.o. female here today for f/u headache and back pain    Chronic bilateral low back pain  She reports continued low back pain since she gave birth to her son 4 months ago.  We obtained an x-ray on 11/30 which was unremarkable.  She denies any recent injuries.  She states that it is especially painful if it is cold outside or if she is standing a lot or doing chores around the house like mopping.  Occasionally she will get some numbness but it is usually isolated to her right knee and calf.  She denies any shooting pain down the back of her leg.  She has been taking ibuprofen and using muscle rubs which help.  She states that a heating pad makes it worse.  She has never done any physical therapy for this or seen a spine specialist.      Migraine without aura, not intractable  She presents today for continued headaches.  She states that she is getting them about 4 times a week.  Usually they are on the right side and she describes them as throbbing but sometimes they affect both sides.  She has a diagnosis of migraine headaches when she was younger and they recently started bothering her again.  She reports associated nausea and she states that it is a little bit harder for her to her to think when she has a headache.  She still has to function and take care of her kids although occasionally they get so bad that she tries to get her kids to watch TV so she can go lay down in a dark room.  Right now, she is not taking any migraine specific medication.        Current medicines (including changes today)  Current Outpatient Medications   Medication Sig Dispense Refill    SUMAtriptan (IMITREX) 50 MG Tab Take 1-2 Tablets by mouth one time as needed for Migraine for up to 1 dose. 9 Tablet 3    fluticasone (FLONASE) 50 MCG/ACT nasal spray Administer 1-2 Sprays into affected nostril(S) every day. 16 g 5    loperamide (IMODIUM) 2 MG Cap Take 1 Capsule by mouth 4 times a day as  needed for Diarrhea. 30 Capsule 3    ibuprofen (MOTRIN) 800 MG Tab Take 1 Tablet by mouth every 8 hours as needed for Moderate Pain. 30 Tablet 3    escitalopram (LEXAPRO) 10 MG Tab Take 1 Tablet by mouth every day.      famciclovir (FAMVIR) 500 MG Tab Take 1 Tablet by mouth 3 times a day. 21 Tablet 0    ondansetron (ZOFRAN ODT) 4 MG TABLET DISPERSIBLE Take 1 Tablet by mouth every 6 hours as needed for Nausea. 10 Tablet 0    Norethindrone Acet-Ethinyl Est (LOESTRIN 1.5/30, 21,) 1.5-30 MG-MCG Tab Take 1 Tablet by mouth every day. 28 Tablet 5    hydrOXYzine HCl (ATARAX) 25 MG Tab Take 1 Tab by mouth 3 times a day as needed for Anxiety. 30 Tab 3     No current facility-administered medications for this visit.     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         Objective:     Vitals:    12/28/22 1323   BP: 104/58   Pulse: 80   Resp: 14   Temp: 35.9 °C (96.7 °F)   SpO2: 95%     Body mass index is 27.73 kg/m².   Physical Exam:  Constitutional: Alert, no distress.  Skin: Warm, dry, good turgor, no rashes in visible areas.  Eye: Equal, round and reactive, conjunctiva clear, lids normal.  Psych: Alert and oriented x3, normal affect and mood.  MSK: tender to palpation over b/l lumbar paraspinal muscles, no focal sensory or motor deficits.    Results:    Dx L spine (11/30/22)  FINDINGS:     The bony trabecular pattern is normal.     Mild levoconvex lumbar curvature     Evaluation of the sacrum is limited due to overlying bowel content.     No acute fracture. No malalignment. No compression deformity.     The disc spaces are well maintained and symmetrical.  There is no evidence of spondylolisthesis or osseous lesion.  The posterior elements appear grossly normal on the limited series.     IMPRESSION:        Mild levoconvex lumbar scoliosis.  No spondylolisthesis.      MRI brain (11/28/22)  FINDINGS: There is no acute infarct. There is no acute or chronic parenchymal hemorrhage.  There is no abnormal signal change in the brain parenchyma. There is no intra-axial space-occupying lesion. There is no extra-axial fluid collection, hemorrhage or   mass. The ventricles, cortical sulci and the basal cisterns are unremarkable. The visualized flow voids of the cerebral vasculature are unremarkable. The intracranial left vertebral artery is hypoplastic.  There is no large lesion identified in the   expected course of the intracranial portions of the cranial nerves.        The skull bones are unremarkable. There is mucus retention cyst in the left maxillary sinus. The extracranial soft tissue including orbits appear grossly normal.        IMPRESSION:     1.  MRI of the brain without contrast within normal limits.  2.  There are no abnormal T2 hyperintensities in the subcortical and periventricular white matter.    Assessment and Plan:   The following treatment plan was discussed    1. Chronic bilateral low back pain, unspecified whether sciatica present  Given unremarkable x-ray, exam, I suspect the pain is still more myofascial although it has persisted.  Discussed trial of physical therapy.  If this is ineffective would consider referral to pain management.   -May continue ibuprofen and topical rubs as needed  - Referral to Physical Therapy    2. Migraine without aura and without status migrainosus, not intractable  Discussed trial of migraine specific medication for abortive therapy.  She is having quite frequent headaches so we could consider prophylactic therapy especially if this is not completely adequate in controlling her symptoms.  She will follow-up with me in 4 to 6 weeks.  - SUMAtriptan (IMITREX) 50 MG Tab; Take 1-2 Tablets by mouth one time as needed for Migraine for up to 1 dose.  Dispense: 9 Tablet; Refill: 3        Followup: Return in about 6 weeks (around 2/8/2023) for headache.

## 2023-01-17 ENCOUNTER — PATIENT MESSAGE (OUTPATIENT)
Dept: MEDICAL GROUP | Facility: MEDICAL CENTER | Age: 28
End: 2023-01-17
Payer: MEDICAID

## 2023-01-17 DIAGNOSIS — L65.9 HAIR LOSS: ICD-10-CM

## 2023-02-15 DIAGNOSIS — K52.9 CHRONIC DIARRHEA: ICD-10-CM

## 2023-02-15 RX ORDER — LOPERAMIDE HYDROCHLORIDE 2 MG/1
2 CAPSULE ORAL 4 TIMES DAILY PRN
Qty: 30 CAPSULE | Refills: 3 | Status: SHIPPED | OUTPATIENT
Start: 2023-02-15 | End: 2023-06-01 | Stop reason: SDUPTHER

## 2023-02-16 ENCOUNTER — OFFICE VISIT (OUTPATIENT)
Dept: MEDICAL GROUP | Facility: MEDICAL CENTER | Age: 28
End: 2023-02-16
Attending: INTERNAL MEDICINE
Payer: MEDICAID

## 2023-02-16 VITALS
SYSTOLIC BLOOD PRESSURE: 100 MMHG | OXYGEN SATURATION: 98 % | BODY MASS INDEX: 28.66 KG/M2 | DIASTOLIC BLOOD PRESSURE: 60 MMHG | HEART RATE: 76 BPM | WEIGHT: 146 LBS | HEIGHT: 60 IN | TEMPERATURE: 97.9 F | RESPIRATION RATE: 16 BRPM

## 2023-02-16 DIAGNOSIS — G43.009 MIGRAINE WITHOUT AURA AND WITHOUT STATUS MIGRAINOSUS, NOT INTRACTABLE: ICD-10-CM

## 2023-02-16 PROBLEM — H53.8 BLURRED VISION: Status: RESOLVED | Noted: 2022-11-16 | Resolved: 2023-02-16

## 2023-02-16 PROBLEM — Z30.011 INITIATION OF OCP (BCP): Status: RESOLVED | Noted: 2021-10-06 | Resolved: 2023-02-16

## 2023-02-16 PROCEDURE — 99213 OFFICE O/P EST LOW 20 MIN: CPT | Performed by: INTERNAL MEDICINE

## 2023-02-16 PROCEDURE — 99212 OFFICE O/P EST SF 10 MIN: CPT | Performed by: INTERNAL MEDICINE

## 2023-02-16 RX ORDER — BUTALBITAL, ACETAMINOPHEN AND CAFFEINE 50; 325; 40 MG/1; MG/1; MG/1
1-2 TABLET ORAL EVERY 6 HOURS PRN
Qty: 10 TABLET | Refills: 0 | Status: SHIPPED | OUTPATIENT
Start: 2023-02-16 | End: 2023-02-26

## 2023-02-16 ASSESSMENT — FIBROSIS 4 INDEX: FIB4 SCORE: 0.57

## 2023-02-16 NOTE — PROGRESS NOTES
Subjective:   Josie Prajapati is a 27 y.o. female here today for f/u headache    Migraine without aura, not intractable  She presents today for follow-up on her headaches.  Unfortunately, we have not been able to get good control of them.  She reports that last week she had a severe headache which caused her to be bedbound for about 3-1/2 days.  She tried both Imitrex (50 and 100 mg) and ibuprofen 800 mg at the onset of symptoms without any improvement.  She was having some nausea but mostly the pain was disabling and she could not take care of her children so her mom had to come over and help.  She felt very dizzy with a headache as well.  She reports some associated nausea.  This coincided with the onset of her menstrual cycle.  Reports symptoms spontaneously improved.  We had recently obtained an MRI of the brain which was unremarkable.  She does report increased stress lately.  Reports some right-sided jaw pain that preceded the headache and also states that her right temple felt swollen while she was having a headache when she put on her glasses but the left side did not.  Denies any associated vision changes but states that the left eye seemed to be wandering a little bit more than normal following the headache.  This has also resolved.       Current medicines (including changes today)  Current Outpatient Medications   Medication Sig Dispense Refill    butalbital/apap/caffeine (FIORICET) -40 mg Tab Take 1-2 Tablets by mouth every 6 hours as needed for Headache or Migraine for up to 10 days. 10 Tablet 0    fluticasone (FLONASE) 50 MCG/ACT nasal spray Administer 1-2 Sprays into affected nostril(S) every day. 16 g 5    loperamide (IMODIUM) 2 MG Cap Take 1 Capsule by mouth 4 times a day as needed for Diarrhea. 30 Capsule 3    ibuprofen (MOTRIN) 800 MG Tab Take 1 Tablet by mouth every 8 hours as needed for Moderate Pain. 30 Tablet 3    escitalopram (LEXAPRO) 10 MG Tab Take 1 Tablet by mouth every  day.      famciclovir (FAMVIR) 500 MG Tab Take 1 Tablet by mouth 3 times a day. 21 Tablet 0    ondansetron (ZOFRAN ODT) 4 MG TABLET DISPERSIBLE Take 1 Tablet by mouth every 6 hours as needed for Nausea. 10 Tablet 0    hydrOXYzine HCl (ATARAX) 25 MG Tab Take 1 Tab by mouth 3 times a day as needed for Anxiety. 30 Tab 3     No current facility-administered medications for this visit.     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         Objective:     Vitals:    02/16/23 1520   BP: 100/60   Pulse: 76   Resp: 16   Temp: 36.6 °C (97.9 °F)   SpO2: 98%     Body mass index is 28.51 kg/m².   Physical Exam:  Constitutional: Alert, no distress.  Skin: Warm, dry, good turgor, no rashes in visible areas.  Eye: Equal, round and reactive, conjunctiva clear, lids normal.  ENMT: TMs are clear bilaterally, no significant tenderness to palpation over TMJ region or temples bilaterally, no appreciable facial swelling or facial droop.  Psych: Alert and oriented x3, normal affect and mood.      Assessment and Plan:   The following treatment plan was discussed    1. Migraine without aura and without status migrainosus, not intractable  Discussed trial of Fioricet.  Did not respond to sumatriptan or NSAIDs.  Given some features of her headaches are atypical however initial work-up has been negative with normal imaging, discussed referral to neurology for confirmation of diagnosis and assistance with therapy.  - Referral to Neurology  - butalbital/apap/caffeine (FIORICET) -40 mg Tab; Take 1-2 Tablets by mouth every 6 hours as needed for Headache or Migraine for up to 10 days.  Dispense: 10 Tablet; Refill: 0        Followup: Return if symptoms worsen or fail to improve.

## 2023-02-16 NOTE — ASSESSMENT & PLAN NOTE
She presents today for follow-up on her headaches.  Unfortunately, we have not been able to get good control of them.  She reports that last week she had a severe headache which caused her to be bedbound for about 3-1/2 days.  She tried both Imitrex (50 and 100 mg) and ibuprofen 800 mg at the onset of symptoms without any improvement.  She was having some nausea but mostly the pain was disabling and she could not take care of her children so her mom had to come over and help.  She felt very dizzy with a headache as well.  She reports some associated nausea.  This coincided with the onset of her menstrual cycle.  Reports symptoms spontaneously improved.  We had recently obtained an MRI of the brain which was unremarkable.  She does report increased stress lately.  Reports some right-sided jaw pain that preceded the headache and also states that her right temple felt swollen while she was having a headache when she put on her glasses but the left side did not.  Denies any associated vision changes but states that the left eye seemed to be wandering a little bit more than normal following the headache.  This has also resolved.

## 2023-02-17 ENCOUNTER — PHYSICAL THERAPY (OUTPATIENT)
Dept: PHYSICAL THERAPY | Facility: REHABILITATION | Age: 28
End: 2023-02-17
Attending: INTERNAL MEDICINE
Payer: MEDICAID

## 2023-02-17 DIAGNOSIS — M54.50 CHRONIC BILATERAL LOW BACK PAIN, UNSPECIFIED WHETHER SCIATICA PRESENT: ICD-10-CM

## 2023-02-17 DIAGNOSIS — G89.29 CHRONIC BILATERAL LOW BACK PAIN, UNSPECIFIED WHETHER SCIATICA PRESENT: ICD-10-CM

## 2023-02-17 PROCEDURE — 97163 PT EVAL HIGH COMPLEX 45 MIN: CPT

## 2023-02-17 ASSESSMENT — ENCOUNTER SYMPTOMS
PAIN SCALE AT LOWEST: 4
PAIN SCALE: 5
EXACERBATED BY: ACTIVITY
EXACERBATED BY: SITTING
PAIN SCALE AT HIGHEST: 9
PAIN TIMING: ALL DAY

## 2023-02-17 NOTE — OP THERAPY EVALUATION
Outpatient Physical Therapy  INITIAL EVALUATION    [facility]  [address]  [address]  [place] [address]  Phone:  [phone]  Fax:  [phone]    Date of Evaluation: 2023    Patient: Josie Prajapati  YOB: 1995  MRN: [id number]     Referring Provider: Michelle Smith M.D.  [address] [facility]  [address]  [place] [address]   Referring Diagnosis Chronic bilateral low back pain, unspecified whether sciatica present [M54.50, G89.29]     Time Calculation  Start time: 1315  Stop time: 1400 Time Calculation (min): 45 minutes         Chief Complaint: Back Problem    Visit Diagnoses     ICD-10-CM   1. Chronic bilateral low back pain, unspecified whether sciatica present  M54.50    G89.29       Date of onset of impairment: 2022    Subjective:   History of Present Illness:     Mechanism of injury:  Pt. is a 27 Y.O. F who reports back pain at the birth of her son 5 months ago.  She feels pain when she is doing house work in the central lumbar, but also when she sits prolonged.  She says it gets so bad she has to throw herself on the ground to aleviate the pain. Xray (Lx)     PMHx: significant for birth 5 months ago     Pain:     Current pain ratin    At best pain ratin    At worst pain ratin    Location:  1. sacrum  2. knee, ant. weakness post.pain 3. planter feet N,. burn when she gets up at night    Quality: 'hurts'    Pain timing:  All day    Aggravating factors:  Activity and sitting    Progression:  Unchanged  Diagnostic Tests:     X-ray: normal (scoliosis)    Treatments:     Current treatment:  Medication (Ibuprofan (800 mg.) taking everyother day, or when pain is severe)  Patient Goals:     Patient goals for therapy:  Decreased pain    Past Medical History:   Diagnosis Date    Anxiety     Facial paralysis/[place] palsy 2022     No past surgical history on file.  Social History     Tobacco Use    Smoking status: Never    Smokeless tobacco:  Never   Substance Use Topics    Alcohol use: Not Currently     Alcohol/week: 1.8 oz     Types: 3 Cans of beer per week     Family and Occupational History     Socioeconomic History    Marital status: Single     Spouse name: Not on file    Number of children: Not on file    Years of education: Not on file    Highest education level: Not on file   Occupational History    Not on file       Objective     Neurological Testing     Reflexes   Left   Patellar (L4): normal (2+)  Achilles (S1): normal (2+)    Right   Patellar (L4): normal (2+)  Achilles (S1): trace (1+)    Myotome testing   Lumbar (left)   All left lumbar myotomes within normal limits    Lumbar (right)   L2 (hip flexors): 4+ (LBP)  L3 (knee extensors): 4-  L4 (ankle dorsiflexors): 5  L5 (great toe extension): 5  S1 (ankle plantar flexors): 3-    Dermatome testing   Lumbar (left)   S1: painful and decreased    Lumbar (right)   S1: decreased    Active Range of Motion     Lumbar   Flexion: within functional limits  Extension: within functional limits  Left rotation: within functional limits  Right rotation: decreased (50%* (s/s sacrum))    Tests     Right Hip   Positive SI compression and SI distraction.     Additional Tests Details  R + JETHRO; SI distraction helped R sacrum p.    Exercises/Treatment  Time-based treatments/modalities:           Assessment, Response and Plan:   Impairments: lacks appropriate home exercise program and pain with function    Assessment details:  Pt. Is a 27 y.o. F who presents with above impairments and S: mod-severe I: U/A to det. N: S1 NRI R>L S: subacute on chronic. Pt. Is appropriate for skilled PT intervention. Pt. was educated in anatomical, pathoanatomical considerations of PT diagosis and treatment options to address it.  Pt. Also educated in self care management and compliance with PT visits/HEP to improve functional limitations and reach goals.     Barriers to therapy:  Time constraints  Prognosis: good    Goals:   Short  Term Goals:   Decrease pain to 3-4/10 or < and centralization of Sx      Long Term Goals:    Decrease % disability on LBP index by 20%  I HEP including self care techniques       Plan:   Therapy options:  Physical therapy treatment to continue  Planned therapy interventions:  Self Care ADL Training (CPT 93643), Massage (CPT 90243) and Therapeutic Exercise (CPT 77799)  Frequency:  1x week  Duration in visits:  6  Discussed with:  Patient    Functional Assessment Used        Referring provider co-signature:  I have reviewed this plan of care and my co-signature certifies the need for services.    Certification Period: 02/17/2023 to  04/14/23    Physician Signature: ________________________________ Date: ______________

## 2023-03-03 ENCOUNTER — PATIENT MESSAGE (OUTPATIENT)
Dept: MEDICAL GROUP | Facility: MEDICAL CENTER | Age: 28
End: 2023-03-03
Payer: MEDICAID

## 2023-03-03 DIAGNOSIS — B00.2 ORAL HERPES SIMPLEX INFECTION: ICD-10-CM

## 2023-03-03 RX ORDER — VALACYCLOVIR HYDROCHLORIDE 1 G/1
1000 TABLET, FILM COATED ORAL DAILY
Qty: 5 TABLET | Refills: 0 | Status: SHIPPED | OUTPATIENT
Start: 2023-03-03 | End: 2023-03-08

## 2023-03-10 ENCOUNTER — PHYSICAL THERAPY (OUTPATIENT)
Dept: PHYSICAL THERAPY | Facility: REHABILITATION | Age: 28
End: 2023-03-10
Attending: INTERNAL MEDICINE
Payer: MEDICAID

## 2023-03-10 DIAGNOSIS — M54.50 LOW BACK PAIN, UNSPECIFIED BACK PAIN LATERALITY, UNSPECIFIED CHRONICITY, UNSPECIFIED WHETHER SCIATICA PRESENT: ICD-10-CM

## 2023-03-10 PROCEDURE — 97124 MASSAGE THERAPY: CPT

## 2023-03-10 PROCEDURE — 97110 THERAPEUTIC EXERCISES: CPT

## 2023-03-10 PROCEDURE — 97535 SELF CARE MNGMENT TRAINING: CPT

## 2023-03-10 NOTE — OP THERAPY DAILY TREATMENT
"  Outpatient Physical Therapy  DAILY TREATMENT     [facility] Physical [address]  [address]  [place] [address]  Phone:  [phone]  Fax:  [phone]    Date: 03/10/2023    Patient: Josie Prajapati  YOB: 1995  MRN: [id number]     Time Calculation    Start time: 0945  Stop time: 1030 Time Calculation (min): 45 minutes         Chief Complaint: No chief complaint on file.    Visit #: 2    SUBJECTIVE:  Sx the same  Pain rating (1-10) before treatment:  5  Pain rating (1-10) after treatment:  4  Location:  1. sacrum  2. knee, ant. weakness post.pain 3. planter feet N,. burn when she gets up at night    Quality: 'hurts'    Pain timing:  All day    Aggravating factors:  Activity /standing and sitting    OBJECTIVE:  Current objective measures: SIJ R LLD longer, R post. Rotation, Pubic ramus R sup and ant.           Therapeutic Exercises (CPT 02536):     1. MET iso Hip ABD, ADD, 5x6\"    2. bridge    3. clam shell      Therapeutic Exercise Summary: SIJ  [[id number]]    Shotgun Technique -  Repeat 5 Times, Hold 7 Seconds,    Therapeutic Treatments and Modalities:     1. Manual Therapy (CPT 64542), L3 PA  R ULPA  G4- *; L5 PA R, and L ULPA G2+*; Paivms L3 L ULPA GIII-; MET to correct R Pubic ramus sup and ant.    2. Self Care ADL Training (CPT 37780), anatomical pathoanatomical considerations of the Pelvis/SIJ; HEP instruction  Time-based treatments/modalities:    Physical Therapy Timed Treatment Charges  Functional training, self care minutes (CPT 57833): 10 minutes  Massage therapy minutes (CPT 18712): 15 minutes  Therapeutic exercise minutes (CPT 07432): 15 minutes    ASSESSMENT:   Response to treatment: no LLD and pelvis level P/W N: S1 NRI R>L  SIJ dysfunction R (hypertension) S: subacute on chronic.    PLAN/RECOMMENDATIONS:   Plan for treatment: therapy treatment to continue next visit.  Planned interventions for next visit: continue with current treatment.         "

## 2023-03-22 ENCOUNTER — PHYSICAL THERAPY (OUTPATIENT)
Dept: PHYSICAL THERAPY | Facility: REHABILITATION | Age: 28
End: 2023-03-22
Attending: INTERNAL MEDICINE
Payer: MEDICAID

## 2023-03-22 DIAGNOSIS — M54.50 MIDLINE LOW BACK PAIN, UNSPECIFIED CHRONICITY, UNSPECIFIED WHETHER SCIATICA PRESENT: ICD-10-CM

## 2023-03-22 PROCEDURE — 97124 MASSAGE THERAPY: CPT

## 2023-03-22 PROCEDURE — 97535 SELF CARE MNGMENT TRAINING: CPT

## 2023-03-22 PROCEDURE — 97110 THERAPEUTIC EXERCISES: CPT

## 2023-03-22 NOTE — OP THERAPY DAILY TREATMENT
"  Outpatient Physical Therapy  DAILY TREATMENT     [facility] Physical [address]  [address]  [place] [address]  Phone:  [phone]  Fax:  [phone]    Date: 03/22/2023    Patient: Josie Prajapati  YOB: 1995  MRN: [id number]     Time Calculation    Start time: 1535  Stop time: 1615 Time Calculation (min): 40 minutes         Chief Complaint: No chief complaint on file.    Visit #: 3    SUBJECTIVE:  Sx improved I still have the sacral and feet burn/n. But not as bad.   Pain rating (1-10) before treatment:  5  Pain rating (1-10) after treatment:  3-4  Location:  1. sacrum  2. knee, ant. weakness post.pain 3. planter feet N,. burn when she gets up at night    Quality: 'hurts    Aggravating factors:  Activity /standing and sitting    OBJECTIVE:  Current objective measures: SIJ Outflair R, Inflair L; sacral rotation (L sup R inf)          Therapeutic Exercises (CPT 21172):     1. MET iso Hip ABD, ADD, 5x6\"    2. bridge, -    3. clam shell, -    4. hooklying diaphram breath, 2x5-6, VC hand on chest and belly    5. hooklying ab set + kegal, 10x, VC/TC      Therapeutic Exercise Summary: Abdominal (Pilates) Exercises Level 1 [DGHFHNE]    Diaphramatic Breathing -     Abdominal brace -  Repeat 10 Times, Hold 5 Seconds, Complete 1 Set, Perform 3 Times a Week    SIJ  [[id number]]    Shotgun Technique -  Repeat 5 Times, Hold 7 Seconds,    Therapeutic Treatments and Modalities:     1. Manual Therapy (CPT 38865), Paivms L3 PA G3 2-3 bouts, L,R ULPA GIII- 1-2 bouts ; MET shot gun, Sacral traction 3x30\"; L sacral mobilization 3 bouts    2. Self Care ADL Training (CPT 49286), Back first aid; hep handout, pt. verbal, demo  understanding  Time-based treatments/modalities:    Physical Therapy Timed Treatment Charges  Functional training, self care minutes (CPT 15952): 10 minutes  Massage therapy minutes (CPT 84489): 15 minutes  Therapeutic exercise minutes (CPT 22583): 15 " minutes    ASSESSMENT:   Response to treatment: pelvis level possible sacral rotation.  Pt. Has very week deep abs will need daily practice. P/W N: S1 NRI R>L  SIJ dysfunction R (hypertension) S: subacute on chronic.    PLAN/RECOMMENDATIONS:   Plan for treatment: therapy treatment to continue next visit.  Planned interventions for next visit: continue with current treatment.

## 2023-03-24 ENCOUNTER — PHYSICAL THERAPY (OUTPATIENT)
Dept: PHYSICAL THERAPY | Facility: REHABILITATION | Age: 28
End: 2023-03-24
Attending: INTERNAL MEDICINE
Payer: MEDICAID

## 2023-03-24 DIAGNOSIS — G89.29 CHRONIC BILATERAL LOW BACK PAIN, UNSPECIFIED WHETHER SCIATICA PRESENT: ICD-10-CM

## 2023-03-24 DIAGNOSIS — M54.50 CHRONIC BILATERAL LOW BACK PAIN, UNSPECIFIED WHETHER SCIATICA PRESENT: ICD-10-CM

## 2023-03-24 PROCEDURE — 97110 THERAPEUTIC EXERCISES: CPT

## 2023-03-24 PROCEDURE — 97140 MANUAL THERAPY 1/> REGIONS: CPT

## 2023-03-24 NOTE — OP THERAPY DAILY TREATMENT
"  Outpatient Physical Therapy  DAILY TREATMENT     [facility] Physical [address]  [address]  [place] [address]  Phone:  [phone]  Fax:  [phone]    Date: 03/24/2023    Patient: Josie Prajapati  YOB: 1995  MRN: [id number]     Time Calculation    Start time: 1115  Stop time: 1210 Time Calculation (min): 55 minutes         Chief Complaint: No chief complaint on file.    Visit #: 4    SUBJECTIVE:  T. Was more sore after the last Rx and today. She still has sacral pain the. LE sx are resolved.   Pain rating (1-10) before treatment:  5  Pain rating (1-10) after treatment: 2-3  Location:  1. sacrum  2. knee, ant. weakness post.pain 3. planter feet N,. burn when she gets up at night    Quality: 'hurts    Aggravating factors:  Activity /standing and sitting    OBJECTIVE:  Current objective measures: able to recruit TVA better w/training.        Therapeutic Exercises (CPT 06097):     1. MET iso Hip ABD, ADD, -, hep    2. bridge, -    3. KFO, 5-6    4. hooklying diaphram breath, 10x w/4# on belly, VC    5. hooklying ab set + kegal, 10x, VC/TC    6. ppu, 2x20\"      Therapeutic Exercise Summary: Abdominal (Pilates) Exercises Level 1 [DGHFHNE]    Diaphramatic Breathing -     Abdominal brace -  Repeat 10 Times, Hold 5 Seconds, Complete 1 Set, Perform 3 Times a Week    SIJ  [[id number]]    Shotgun Technique -  Repeat 5 Times, Hold 7 Seconds,    Therapeutic Treatments and Modalities:     1. Manual Therapy (CPT 22597), Paivms: L sacral PA3 bouts , Sacral traction 3x30\";    2. E Stim Unattended (CPT 10629), IFC SIJ bias L mA set to pt. comfort w/MHP, N/C  Time-based treatments/modalities:         ASSESSMENT:   Response to treatment: Pt .able to recruit TvA better today post training and do beg. Core stab ex. P/W N: S1 NRI R>L  SIJ dysfunction R (hypertension) S: subacute on chronic.    PLAN/RECOMMENDATIONS:   Plan for treatment: therapy treatment to continue next visit.  Planned " interventions for next visit: continue with current treatment.

## 2023-03-31 ENCOUNTER — PHYSICAL THERAPY (OUTPATIENT)
Dept: PHYSICAL THERAPY | Facility: REHABILITATION | Age: 28
End: 2023-03-31
Attending: INTERNAL MEDICINE
Payer: MEDICAID

## 2023-03-31 DIAGNOSIS — M54.40 CHRONIC MIDLINE LOW BACK PAIN WITH SCIATICA, SCIATICA LATERALITY UNSPECIFIED: ICD-10-CM

## 2023-03-31 DIAGNOSIS — G89.29 CHRONIC MIDLINE LOW BACK PAIN WITH SCIATICA, SCIATICA LATERALITY UNSPECIFIED: ICD-10-CM

## 2023-03-31 PROCEDURE — 97110 THERAPEUTIC EXERCISES: CPT

## 2023-03-31 PROCEDURE — 97535 SELF CARE MNGMENT TRAINING: CPT

## 2023-03-31 NOTE — OP THERAPY DAILY TREATMENT
"Outpatient Physical Therapy  DAILY TREATMENT     [facility] Physical [address]  [address]  [place] [address]  Phone:  [phone]  Fax:  [phone]    Date: 03/31/2023    Patient: Josie Prajapati  YOB: 1995  MRN: [id number]     Time Calculation    Start time: 1118  Stop time: 1200 Time Calculation (min): 42 minutes         Chief Complaint: No chief complaint on file.    Visit #: 5    SUBJECTIVE:  \"I feel good.!\"  Pain rating (1-10) before treatment:  0-1  Pain rating (1-10) after treatment: 0  Location:  1. sacrum  2. knee, ant. weakness post.pain 3. planter feet N,. burn when she gets up at night    Quality: 'hurts    Aggravating factors:  Activity /standing and sitting    OBJECTIVE:  Current objective measures:           Therapeutic Exercises (CPT 29097):     1. MET iso Hip ABD, ADD, -, hep    2. bridge w/OH reach, 10, VC/TC    3. Qped UE reach, R, L 10x    4. hooklying diaphram breath, hep    5. hooklying ab set + kegal, hep    6. ppu w/  hip ER, 5x5-10\", TC/demo    7. bench squat, 1x14, TC/demo    8. standing static Hip ABD, R, L 10 Bl TB, UE support, VC/demo for technique    9. standing crab walks, 2x 8 ft.Bl TB, VC/demo for technique      Therapeutic Exercise Summary: Abdominal (Pilates) Exercises Level 1 [DGHFHNE]    Diaphramatic Breathing -     Abdominal brace -  Repeat 10 Times, Hold 5 Seconds, Complete 1 Set, Perform 3 Times a Week    SIJ  [[id number]]    Shotgun Technique -  Repeat 5 Times, Hold 7 Seconds,    Therapeutic Treatments and Modalities:     1. Self Care ADL Training (CPT 53898), warm up, work out, cool down on stat bike    2. E Stim Unattended (CPT 79138), not today  Time-based treatments/modalities:    Physical Therapy Timed Treatment Charges  Functional training, self care minutes (CPT 88656): 8 minutes  Therapeutic exercise minutes (CPT 75606): 32 minutes    ASSESSMENT:   Response to treatment:  need VC/TC demo for progression " exercises.  Goals:   Short Term Goals:   Decrease pain to 3-4/10 or < and centralization of Sx met      PLAN/RECOMMENDATIONS:   Plan for treatment: therapy treatment to continue next visit.  Planned interventions for next visit:  prep for last visit, HEP of functional strengthening .

## 2023-04-07 ENCOUNTER — PHYSICAL THERAPY (OUTPATIENT)
Dept: PHYSICAL THERAPY | Facility: REHABILITATION | Age: 28
End: 2023-04-07
Attending: INTERNAL MEDICINE
Payer: MEDICAID

## 2023-04-07 DIAGNOSIS — G89.29 CHRONIC BILATERAL LOW BACK PAIN WITHOUT SCIATICA: ICD-10-CM

## 2023-04-07 DIAGNOSIS — M54.50 CHRONIC BILATERAL LOW BACK PAIN WITHOUT SCIATICA: ICD-10-CM

## 2023-04-07 PROCEDURE — 97110 THERAPEUTIC EXERCISES: CPT

## 2023-04-07 PROCEDURE — 97535 SELF CARE MNGMENT TRAINING: CPT

## 2023-04-07 NOTE — OP THERAPY DAILY TREATMENT
"Outpatient Physical Therapy  DAILY TREATMENT     [facility] Physical [address]  [address]  [place] [address]  Phone:  [phone]  Fax:  [phone]    Date: 04/07/2023    Patient: Josie Prajapait  YOB: 1995  MRN: [id number]     Time Calculation    Start time: 1118  Stop time: 1157 Time Calculation (min): 39 minutes         Dx: LBP    Visit #: 6    SUBJECTIVE:  \"I feel good. I haven't had my symptoms in a while\" She no longer has knee weakness either. \"I like the exercises, but I have a wrist /carpal bone issue so it hurts when I put weight on it.\"  Pain rating (1-10) before treatment:  0  Pain rating (1-10) after treatment: 0  Location:  1. sacrum  2. knee, ant. weakness post.pain     OBJECTIVE:  Current objective measures:  pt. Can do L/S AROM and functional positions, sit>stand, w/out pain difficulty          Therapeutic Exercises (CPT 31534):     1. MET iso Hip ABD, ADD, -, hep    2. bridge w/OH reach, 2x5    3. Qped UE reach, R, L 2x 5x, D/C L wrist problem    4. hooklying diaphram breath, hep    5. hooklying ab set + kegal, hep    6. ppu w/  hip ER, 5x5-10\", TC/demo    7. bench squat, 1x14, TC/VC 'use your butt\"    8. cat cow, 5x5-10\", towel roll under L hand    10. crunch      Therapeutic Exercise Summary: Home Exercise Program [[id number]]    Body weight Squat -  Repeat 10 Times, Hold 2 Seconds, Complete 1 Set,    Shoulder flexion with bridging  -  Repeat 10 Times, Hold 2 Seconds, Complete 1 Set, Times a Day    Crunch - Level One -  Repeat 10 Times, Complete 2 Sets, Perform 4 Times a Week    COBRA POSE - YOGA -DRESANA -  Duration 10 Seconds, Complete 2 Sets, Perform 4 Times a Week    cat/cow -  Repeat 10 Times, Hold 3 Seconds, Complete 1 Set, Perform 4 Times a Week    Therapeutic Treatments and Modalities:     1. Self Care ADL Training (CPT 54002), hep w/sets reps freq  Time-based treatments/modalities:    Physical Therapy Timed Treatment " Charges  Functional training, self care minutes (CPT 09265): 10 minutes  Therapeutic exercise minutes (CPT 49901): 28 minutes    ASSESSMENT:   Short Term Goals:   Decrease pain to 3-4/10 or < and centralization of Sx - Met      Long Term Goals:    Decrease % disability on LBP index by 20% - NA  I HEP including self care techniques Goals: - Met today       PLAN/RECOMMENDATIONS:   Plan for treatment: discharge patient due to accomplished goals.

## 2023-06-01 ENCOUNTER — PATIENT MESSAGE (OUTPATIENT)
Dept: MEDICAL GROUP | Facility: MEDICAL CENTER | Age: 28
End: 2023-06-01
Payer: MEDICAID

## 2023-06-01 DIAGNOSIS — M54.50 CHRONIC BILATERAL LOW BACK PAIN, UNSPECIFIED WHETHER SCIATICA PRESENT: ICD-10-CM

## 2023-06-01 DIAGNOSIS — K52.9 CHRONIC DIARRHEA: ICD-10-CM

## 2023-06-01 DIAGNOSIS — G89.29 CHRONIC BILATERAL LOW BACK PAIN, UNSPECIFIED WHETHER SCIATICA PRESENT: ICD-10-CM

## 2023-06-01 RX ORDER — IBUPROFEN 800 MG/1
800 TABLET ORAL EVERY 8 HOURS PRN
Qty: 30 TABLET | Refills: 5 | Status: SHIPPED | OUTPATIENT
Start: 2023-06-01

## 2023-06-01 NOTE — PATIENT COMMUNICATION
Received request via: Patient    Was the patient seen in the last year in this department? Yes    Does the patient have an active prescription (recently filled or refills available) for medication(s) requested? No    Does the patient have [facility] and need 100 day supply (blood pressure, diabetes and cholesterol meds only)? Patient does not have SCP  Requested Prescriptions     Pending Prescriptions Disp Refills    ibuprofen (MOTRIN) 800 MG Tab 30 Tablet 0     Sig: Take 1 Tablet by mouth every 8 hours as needed for Moderate Pain.

## 2023-06-02 RX ORDER — LOPERAMIDE HYDROCHLORIDE 2 MG/1
2 CAPSULE ORAL 4 TIMES DAILY PRN
Qty: 30 CAPSULE | Refills: 3 | Status: SHIPPED | OUTPATIENT
Start: 2023-06-02 | End: 2023-10-09 | Stop reason: SDUPTHER

## 2023-06-02 NOTE — TELEPHONE ENCOUNTER
Received request via: Pharmacy    Was the patient seen in the last year in this department? Yes    Does the patient have an active prescription (recently filled or refills available) for medication(s) requested? No    Does the patient have [facility] and need 100 day supply (blood pressure, diabetes and cholesterol meds only)? Patient does not have SCP   No appt scheduled at this time

## 2023-08-07 PROCEDURE — RXMED WILLOW AMBULATORY MEDICATION CHARGE: Performed by: INTERNAL MEDICINE

## 2023-08-08 DIAGNOSIS — J31.0 CHRONIC RHINITIS: ICD-10-CM

## 2023-08-09 RX ORDER — FLUTICASONE PROPIONATE 50 MCG
1-2 SPRAY, SUSPENSION (ML) NASAL DAILY
Qty: 16 G | Refills: 5 | Status: SHIPPED | OUTPATIENT
Start: 2023-08-09

## 2023-08-09 NOTE — TELEPHONE ENCOUNTER
Received request via: Pharmacy    Was the patient seen in the last year in this department? Yes    Does the patient have an active prescription (recently filled or refills available) for medication(s) requested? No    Does the patient have care home Plus and need 100 day supply (blood pressure, diabetes and cholesterol meds only)? Patient does not have SCP

## 2023-08-21 ENCOUNTER — PHARMACY VISIT (OUTPATIENT)
Dept: PHARMACY | Facility: MEDICAL CENTER | Age: 28
End: 2023-08-21
Payer: COMMERCIAL

## 2023-10-09 DIAGNOSIS — K52.9 CHRONIC DIARRHEA: ICD-10-CM

## 2023-10-10 RX ORDER — LOPERAMIDE HYDROCHLORIDE 2 MG/1
2 CAPSULE ORAL 4 TIMES DAILY PRN
Qty: 30 CAPSULE | Refills: 3 | Status: SHIPPED | OUTPATIENT
Start: 2023-10-10

## 2023-10-10 NOTE — TELEPHONE ENCOUNTER
Received request via: Pharmacy    Was the patient seen in the last year in this department? Yes    Does the patient have an active prescription (recently filled or refills available) for medication(s) requested? No    Does the patient have [facility] and need 100 day supply (blood pressure, diabetes and cholesterol meds only)? Patient does not have SCP  Future Appointments         Provider Department [place]    10/19/2023 11:00 AM (Arrive by 10:45 AM) Michelle Simth M.D. [facility]

## 2024-03-06 NOTE — ED TRIAGE NOTES
.  Chief Complaint   Patient presents with   • Rash     Pt was here this am for the same but rash has gotten worse spreading to both arm, face, chest, back. Rash is itching. Pt took benadryl at home with no relief. Denies new products or being outdoors during the past few days.      ./99   Pulse 86   Temp 37.2 °C (99 °F) (Temporal)   Resp 19   SpO2 99%      [facility]

## 2024-04-15 PROCEDURE — RXMED WILLOW AMBULATORY MEDICATION CHARGE: Performed by: INTERNAL MEDICINE

## 2024-04-16 ENCOUNTER — PHARMACY VISIT (OUTPATIENT)
Dept: PHARMACY | Facility: MEDICAL CENTER | Age: 29
End: 2024-04-16
Payer: COMMERCIAL

## 2024-04-19 ENCOUNTER — PHARMACY VISIT (OUTPATIENT)
Dept: PHARMACY | Facility: MEDICAL CENTER | Age: 29
End: 2024-04-19
Payer: COMMERCIAL

## 2024-04-19 ENCOUNTER — OFFICE VISIT (OUTPATIENT)
Dept: MEDICAL GROUP | Facility: MEDICAL CENTER | Age: 29
End: 2024-04-19
Attending: INTERNAL MEDICINE
Payer: MEDICAID

## 2024-04-19 ENCOUNTER — HOSPITAL ENCOUNTER (OUTPATIENT)
Facility: MEDICAL CENTER | Age: 29
End: 2024-04-19
Attending: FAMILY MEDICINE
Payer: MEDICAID

## 2024-04-19 VITALS
DIASTOLIC BLOOD PRESSURE: 70 MMHG | OXYGEN SATURATION: 99 % | TEMPERATURE: 97.3 F | HEART RATE: 74 BPM | BODY MASS INDEX: 28.47 KG/M2 | WEIGHT: 145 LBS | RESPIRATION RATE: 16 BRPM | SYSTOLIC BLOOD PRESSURE: 104 MMHG | HEIGHT: 60 IN

## 2024-04-19 DIAGNOSIS — B37.31 VAGINAL CANDIDIASIS: ICD-10-CM

## 2024-04-19 DIAGNOSIS — G89.29 CHRONIC BILATERAL LOW BACK PAIN, UNSPECIFIED WHETHER SCIATICA PRESENT: ICD-10-CM

## 2024-04-19 DIAGNOSIS — M54.50 CHRONIC BILATERAL LOW BACK PAIN, UNSPECIFIED WHETHER SCIATICA PRESENT: ICD-10-CM

## 2024-04-19 PROCEDURE — 99213 OFFICE O/P EST LOW 20 MIN: CPT | Performed by: INTERNAL MEDICINE

## 2024-04-19 PROCEDURE — 87591 N.GONORRHOEAE DNA AMP PROB: CPT

## 2024-04-19 PROCEDURE — RXMED WILLOW AMBULATORY MEDICATION CHARGE: Performed by: INTERNAL MEDICINE

## 2024-04-19 PROCEDURE — 99214 OFFICE O/P EST MOD 30 MIN: CPT | Performed by: INTERNAL MEDICINE

## 2024-04-19 PROCEDURE — 3074F SYST BP LT 130 MM HG: CPT | Performed by: INTERNAL MEDICINE

## 2024-04-19 PROCEDURE — 87491 CHLMYD TRACH DNA AMP PROBE: CPT

## 2024-04-19 PROCEDURE — 87510 GARDNER VAG DNA DIR PROBE: CPT

## 2024-04-19 PROCEDURE — 87660 TRICHOMONAS VAGIN DIR PROBE: CPT

## 2024-04-19 PROCEDURE — 3078F DIAST BP <80 MM HG: CPT | Performed by: INTERNAL MEDICINE

## 2024-04-19 PROCEDURE — 87480 CANDIDA DNA DIR PROBE: CPT

## 2024-04-19 RX ORDER — FLUCONAZOLE 150 MG/1
150 TABLET ORAL DAILY
Qty: 1 TABLET | Refills: 0 | Status: SHIPPED | OUTPATIENT
Start: 2024-04-19

## 2024-04-19 RX ORDER — IBUPROFEN 800 MG/1
800 TABLET ORAL EVERY 8 HOURS PRN
Qty: 30 TABLET | Refills: 5 | Status: SHIPPED | OUTPATIENT
Start: 2024-04-19

## 2024-04-19 ASSESSMENT — FIBROSIS 4 INDEX: FIB4 SCORE: 0.59

## 2024-04-20 LAB
C TRACH DNA GENITAL QL NAA+PROBE: NEGATIVE
CANDIDA DNA VAG QL PROBE+SIG AMP: NEGATIVE
G VAGINALIS DNA VAG QL PROBE+SIG AMP: NEGATIVE
N GONORRHOEA DNA GENITAL QL NAA+PROBE: NEGATIVE
SPECIMEN SOURCE: NORMAL
T VAGINALIS DNA VAG QL PROBE+SIG AMP: NEGATIVE

## 2024-04-23 PROBLEM — R20.2 NUMBNESS AND TINGLING OF LEG: Status: RESOLVED | Noted: 2022-11-16 | Resolved: 2024-04-23

## 2024-04-23 PROBLEM — R20.0 NUMBNESS AND TINGLING OF LEG: Status: RESOLVED | Noted: 2022-11-16 | Resolved: 2024-04-23

## 2024-04-23 PROBLEM — B37.31 VAGINAL CANDIDIASIS: Status: ACTIVE | Noted: 2024-04-23

## 2024-04-23 NOTE — PROGRESS NOTES
Subjective:   Josie Prajapati is a 28 y.o. female here today for vaginal irritation    Vaginal candidiasis  She reports increased vaginal itching with some thick white discharge that started on Monday.  The discharge subsided by Tuesday but she continues to have significant perineal/genital irritation.  She is sexually active with 1 partner and has not had any new partners recently.  She denies fevers, chills, dysuria, pelvic pain.    Chronic bilateral low back pain  She is requesting a refill of her ibuprofen.  Reports that her pain is controlled on this regimen       Current medicines (including changes today)  Current Outpatient Medications   Medication Sig Dispense Refill    fluconazole (DIFLUCAN) 150 MG tablet Take 1 Tablet by mouth every day. 1 Tablet 0    ibuprofen (MOTRIN) 800 MG Tab Take 1 Tablet by mouth every 8 hours as needed for Moderate Pain. 30 Tablet 5    loperamide (IMODIUM) 2 MG Cap Take 1 capsule by mouth 4 times a day as needed for Diarrhea. 30 Capsule 3    fluticasone (FLONASE) 50 MCG/ACT nasal spray Administer 1-2 Sprays into affected nostril(S) every day. 16 g 5    escitalopram (LEXAPRO) 10 MG Tab Take 1 Tablet by mouth every day.      ondansetron (ZOFRAN ODT) 4 MG TABLET DISPERSIBLE Take 1 Tablet by mouth every 6 hours as needed for Nausea. 10 Tablet 0    hydrOXYzine HCl (ATARAX) 25 MG Tab Take 1 Tab by mouth 3 times a day as needed for Anxiety. 30 Tab 3     No current facility-administered medications for this visit.     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         Objective:     Vitals:    04/19/24 1452   BP: 104/70   Pulse: 74   Resp: 16   Temp: 36.3 °C (97.3 °F)   SpO2: 99%     Body mass index is 28.32 kg/m².   Physical Exam:  Constitutional: Alert, no distress.  Skin: Warm, dry, good turgor, no rashes in visible areas.  Eye: Equal, round and reactive, conjunctiva clear, lids normal.  : No external genital lesions but there  is some thick white discharge and erythema/induration of the inner mucosal part of the labia majora and minora suggesting candidiasis  Psych: Alert and oriented x3, normal affect and mood.      Assessment and Plan:   The following treatment plan was discussed    1. Vaginal candidiasis  Will treat empirically as below, obtain vaginal pathogen testing to rule out other causes  - fluconazole (DIFLUCAN) 150 MG tablet; Take 1 Tablet by mouth every day.  Dispense: 1 Tablet; Refill: 0  - Chlamydia/GC, PCR (Urine); Future  - VAGINAL PATHOGENS DNA PANEL; Future    2. Chronic bilateral low back pain, unspecified whether sciatica present  Stable, well controlled with current meds which were refilled today.  - ibuprofen (MOTRIN) 800 MG Tab; Take 1 Tablet by mouth every 8 hours as needed for Moderate Pain.  Dispense: 30 Tablet; Refill: 5        Followup: Return if symptoms worsen or fail to improve.

## 2024-04-23 NOTE — ASSESSMENT & PLAN NOTE
She reports increased vaginal itching with some thick white discharge that started on Monday.  The discharge subsided by Tuesday but she continues to have significant perineal/genital irritation.  She is sexually active with 1 partner and has not had any new partners recently.  She denies fevers, chills, dysuria, pelvic pain.

## 2024-04-24 ENCOUNTER — PATIENT MESSAGE (OUTPATIENT)
Dept: MEDICAL GROUP | Facility: MEDICAL CENTER | Age: 29
End: 2024-04-24
Payer: MEDICAID

## 2024-04-24 DIAGNOSIS — L29.3 PRURITUS, GENITAL: ICD-10-CM

## 2024-04-24 RX ORDER — CLOBETASOL PROPIONATE 0.5 MG/G
CREAM TOPICAL
Qty: 15 G | Refills: 0 | Status: SHIPPED | OUTPATIENT
Start: 2024-04-24

## 2024-06-27 DIAGNOSIS — M54.50 CHRONIC BILATERAL LOW BACK PAIN, UNSPECIFIED WHETHER SCIATICA PRESENT: ICD-10-CM

## 2024-06-27 DIAGNOSIS — G89.29 CHRONIC BILATERAL LOW BACK PAIN, UNSPECIFIED WHETHER SCIATICA PRESENT: ICD-10-CM

## 2024-06-28 RX ORDER — IBUPROFEN 800 MG/1
800 TABLET ORAL EVERY 8 HOURS PRN
Qty: 30 TABLET | Refills: 3 | Status: SHIPPED | OUTPATIENT
Start: 2024-06-28

## 2024-07-12 ENCOUNTER — PHARMACY VISIT (OUTPATIENT)
Dept: PHARMACY | Facility: MEDICAL CENTER | Age: 29
End: 2024-07-12
Payer: COMMERCIAL

## 2024-07-12 DIAGNOSIS — G89.29 CHRONIC BILATERAL LOW BACK PAIN, UNSPECIFIED WHETHER SCIATICA PRESENT: ICD-10-CM

## 2024-07-12 DIAGNOSIS — M54.50 CHRONIC BILATERAL LOW BACK PAIN, UNSPECIFIED WHETHER SCIATICA PRESENT: ICD-10-CM

## 2024-07-12 PROCEDURE — RXMED WILLOW AMBULATORY MEDICATION CHARGE: Performed by: INTERNAL MEDICINE

## 2024-07-12 RX ORDER — IBUPROFEN 800 MG/1
800 TABLET ORAL EVERY 8 HOURS PRN
Qty: 30 TABLET | Refills: 3 | Status: SHIPPED | OUTPATIENT
Start: 2024-07-12

## 2024-09-03 DIAGNOSIS — K52.9 CHRONIC DIARRHEA: ICD-10-CM

## 2024-09-04 RX ORDER — LOPERAMIDE HCL 2 MG
CAPSULE ORAL
Qty: 30 CAPSULE | Refills: 3 | Status: SHIPPED | OUTPATIENT
Start: 2024-09-04

## 2024-09-04 NOTE — TELEPHONE ENCOUNTER
Received request via: Pharmacy    Was the patient seen in the last year in this department? Yes    Does the patient have an active prescription (recently filled or refills available) for medication(s) requested? No    Pharmacy Name: [facility]    Does the patient have [facility] and need 100-day supply? (This applies to ALL medications) Patient does not have SCP

## 2024-10-23 ENCOUNTER — PATIENT MESSAGE (OUTPATIENT)
Dept: MEDICAL GROUP | Facility: MEDICAL CENTER | Age: 29
End: 2024-10-23
Payer: MEDICAID

## 2024-10-23 DIAGNOSIS — K52.9 CHRONIC DIARRHEA: ICD-10-CM

## 2024-10-24 DIAGNOSIS — M54.50 CHRONIC BILATERAL LOW BACK PAIN, UNSPECIFIED WHETHER SCIATICA PRESENT: ICD-10-CM

## 2024-10-24 DIAGNOSIS — K52.9 CHRONIC DIARRHEA: ICD-10-CM

## 2024-10-24 DIAGNOSIS — G89.29 CHRONIC BILATERAL LOW BACK PAIN, UNSPECIFIED WHETHER SCIATICA PRESENT: ICD-10-CM

## 2024-10-24 RX ORDER — LOPERAMIDE HYDROCHLORIDE 2 MG/1
CAPSULE ORAL
Qty: 30 CAPSULE | Refills: 3 | Status: SHIPPED | OUTPATIENT
Start: 2024-10-24 | End: 2024-10-29

## 2024-10-29 RX ORDER — LOPERAMIDE HYDROCHLORIDE 2 MG/1
2 CAPSULE ORAL 4 TIMES DAILY PRN
Qty: 30 CAPSULE | Refills: 3 | Status: SHIPPED | OUTPATIENT
Start: 2024-10-29

## 2024-10-29 RX ORDER — IBUPROFEN 800 MG/1
800 TABLET, FILM COATED ORAL EVERY 8 HOURS PRN
Qty: 30 TABLET | Refills: 3 | Status: SHIPPED | OUTPATIENT
Start: 2024-10-29

## 2024-12-16 DIAGNOSIS — K52.9 CHRONIC DIARRHEA: ICD-10-CM

## 2024-12-17 RX ORDER — LOPERAMIDE HYDROCHLORIDE 2 MG/1
2 CAPSULE ORAL 4 TIMES DAILY PRN
Qty: 30 CAPSULE | Refills: 3 | Status: SHIPPED | OUTPATIENT
Start: 2024-12-17

## 2025-02-14 DIAGNOSIS — K52.9 CHRONIC DIARRHEA: ICD-10-CM

## 2025-02-14 DIAGNOSIS — G89.29 CHRONIC BILATERAL LOW BACK PAIN, UNSPECIFIED WHETHER SCIATICA PRESENT: ICD-10-CM

## 2025-02-14 DIAGNOSIS — M54.50 CHRONIC BILATERAL LOW BACK PAIN, UNSPECIFIED WHETHER SCIATICA PRESENT: ICD-10-CM

## 2025-02-14 RX ORDER — IBUPROFEN 800 MG/1
800 TABLET, FILM COATED ORAL EVERY 8 HOURS PRN
Qty: 30 TABLET | Refills: 3 | Status: SHIPPED | OUTPATIENT
Start: 2025-02-14

## 2025-02-14 RX ORDER — LOPERAMIDE HYDROCHLORIDE 2 MG/1
2 CAPSULE ORAL 4 TIMES DAILY PRN
Qty: 30 CAPSULE | Refills: 3 | Status: SHIPPED | OUTPATIENT
Start: 2025-02-14

## 2025-02-28 ENCOUNTER — APPOINTMENT (OUTPATIENT)
Dept: RADIOLOGY | Facility: MEDICAL CENTER | Age: 30
End: 2025-02-28
Attending: EMERGENCY MEDICINE
Payer: MEDICAID

## 2025-02-28 ENCOUNTER — HOSPITAL ENCOUNTER (EMERGENCY)
Facility: MEDICAL CENTER | Age: 30
End: 2025-02-28
Attending: EMERGENCY MEDICINE
Payer: MEDICAID

## 2025-02-28 VITALS
BODY MASS INDEX: 29.58 KG/M2 | RESPIRATION RATE: 15 BRPM | DIASTOLIC BLOOD PRESSURE: 72 MMHG | OXYGEN SATURATION: 100 % | HEART RATE: 86 BPM | WEIGHT: 151.46 LBS | SYSTOLIC BLOOD PRESSURE: 132 MMHG | TEMPERATURE: 97.6 F

## 2025-02-28 DIAGNOSIS — R55 NEAR SYNCOPE: ICD-10-CM

## 2025-02-28 LAB
ALBUMIN SERPL BCP-MCNC: 4.3 G/DL (ref 3.2–4.9)
ALBUMIN/GLOB SERPL: 1.3 G/DL
ALP SERPL-CCNC: 80 U/L (ref 30–99)
ALT SERPL-CCNC: 40 U/L (ref 2–50)
ANION GAP SERPL CALC-SCNC: 10 MMOL/L (ref 7–16)
APPEARANCE UR: CLEAR
AST SERPL-CCNC: 28 U/L (ref 12–45)
BACTERIA #/AREA URNS HPF: ABNORMAL /HPF
BASOPHILS # BLD AUTO: 0.5 % (ref 0–1.8)
BASOPHILS # BLD: 0.05 K/UL (ref 0–0.12)
BILIRUB SERPL-MCNC: 0.6 MG/DL (ref 0.1–1.5)
BILIRUB UR QL STRIP.AUTO: NEGATIVE
BUN SERPL-MCNC: 9 MG/DL (ref 8–22)
CALCIUM ALBUM COR SERPL-MCNC: 8.8 MG/DL (ref 8.5–10.5)
CALCIUM SERPL-MCNC: 9 MG/DL (ref 8.5–10.5)
CASTS URNS QL MICRO: ABNORMAL /LPF (ref 0–2)
CHLORIDE SERPL-SCNC: 107 MMOL/L (ref 96–112)
CO2 SERPL-SCNC: 22 MMOL/L (ref 20–33)
COLOR UR: YELLOW
CREAT SERPL-MCNC: 0.61 MG/DL (ref 0.5–1.4)
EKG IMPRESSION: NORMAL
EOSINOPHIL # BLD AUTO: 0.27 K/UL (ref 0–0.51)
EOSINOPHIL NFR BLD: 2.7 % (ref 0–6.9)
EPITHELIAL CELLS 1715: ABNORMAL /HPF (ref 0–5)
ERYTHROCYTE [DISTWIDTH] IN BLOOD BY AUTOMATED COUNT: 39.7 FL (ref 35.9–50)
FLUAV RNA SPEC QL NAA+PROBE: NEGATIVE
FLUBV RNA SPEC QL NAA+PROBE: NEGATIVE
GFR SERPLBLD CREATININE-BSD FMLA CKD-EPI: 123 ML/MIN/1.73 M 2
GLOBULIN SER CALC-MCNC: 3.2 G/DL (ref 1.9–3.5)
GLUCOSE SERPL-MCNC: 98 MG/DL (ref 65–99)
GLUCOSE UR STRIP.AUTO-MCNC: NEGATIVE MG/DL
HCG UR QL: NEGATIVE
HCT VFR BLD AUTO: 43.3 % (ref 37–47)
HGB BLD-MCNC: 14.3 G/DL (ref 12–16)
IMM GRANULOCYTES # BLD AUTO: 0.05 K/UL (ref 0–0.11)
IMM GRANULOCYTES NFR BLD AUTO: 0.5 % (ref 0–0.9)
KETONES UR STRIP.AUTO-MCNC: ABNORMAL MG/DL
LEUKOCYTE ESTERASE UR QL STRIP.AUTO: ABNORMAL
LYMPHOCYTES # BLD AUTO: 1.91 K/UL (ref 1–4.8)
LYMPHOCYTES NFR BLD: 19.3 % (ref 22–41)
MCH RBC QN AUTO: 29.9 PG (ref 27–33)
MCHC RBC AUTO-ENTMCNC: 33 G/DL (ref 32.2–35.5)
MCV RBC AUTO: 90.6 FL (ref 81.4–97.8)
MICRO URNS: ABNORMAL
MONOCYTES # BLD AUTO: 0.41 K/UL (ref 0–0.85)
MONOCYTES NFR BLD AUTO: 4.1 % (ref 0–13.4)
NEUTROPHILS # BLD AUTO: 7.21 K/UL (ref 1.82–7.42)
NEUTROPHILS NFR BLD: 72.9 % (ref 44–72)
NITRITE UR QL STRIP.AUTO: NEGATIVE
NRBC # BLD AUTO: 0 K/UL
NRBC BLD-RTO: 0 /100 WBC (ref 0–0.2)
PH UR STRIP.AUTO: 6 [PH] (ref 5–8)
PLATELET # BLD AUTO: 274 K/UL (ref 164–446)
PMV BLD AUTO: 10.9 FL (ref 9–12.9)
POTASSIUM SERPL-SCNC: 4.3 MMOL/L (ref 3.6–5.5)
PROT SERPL-MCNC: 7.5 G/DL (ref 6–8.2)
PROT UR QL STRIP: NEGATIVE MG/DL
RBC # BLD AUTO: 4.78 M/UL (ref 4.2–5.4)
RBC # URNS HPF: ABNORMAL /HPF (ref 0–2)
RBC UR QL AUTO: NEGATIVE
RSV RNA SPEC QL NAA+PROBE: NEGATIVE
SARS-COV-2 RNA RESP QL NAA+PROBE: NOTDETECTED
SODIUM SERPL-SCNC: 139 MMOL/L (ref 135–145)
SP GR UR STRIP.AUTO: 1.01
TRANS CELLS URNS QL MICRO: PRESENT /HPF
TROPONIN T SERPL-MCNC: <6 NG/L (ref 6–19)
UROBILINOGEN UR STRIP.AUTO-MCNC: 0.2 EU/DL
WBC # BLD AUTO: 9.9 K/UL (ref 4.8–10.8)
WBC #/AREA URNS HPF: ABNORMAL /HPF

## 2025-02-28 PROCEDURE — 0241U HCHG SARS-COV-2 COVID-19 NFCT DS RESP RNA 4 TRGT ED POC: CPT

## 2025-02-28 PROCEDURE — 84484 ASSAY OF TROPONIN QUANT: CPT

## 2025-02-28 PROCEDURE — 36415 COLL VENOUS BLD VENIPUNCTURE: CPT

## 2025-02-28 PROCEDURE — 93005 ELECTROCARDIOGRAM TRACING: CPT | Mod: TC

## 2025-02-28 PROCEDURE — 81001 URINALYSIS AUTO W/SCOPE: CPT

## 2025-02-28 PROCEDURE — 93005 ELECTROCARDIOGRAM TRACING: CPT | Mod: TC | Performed by: EMERGENCY MEDICINE

## 2025-02-28 PROCEDURE — 99284 EMERGENCY DEPT VISIT MOD MDM: CPT

## 2025-02-28 PROCEDURE — 81025 URINE PREGNANCY TEST: CPT

## 2025-02-28 PROCEDURE — 85025 COMPLETE CBC W/AUTO DIFF WBC: CPT

## 2025-02-28 PROCEDURE — 80053 COMPREHEN METABOLIC PANEL: CPT

## 2025-02-28 PROCEDURE — 71045 X-RAY EXAM CHEST 1 VIEW: CPT

## 2025-02-28 NOTE — ED PROVIDER NOTES
ED Provider Note    CHIEF COMPLAINT  Chief Complaint   Patient presents with    Dizziness     Onset this am when woke slightly improved when laying down    Nausea       EXTERNAL RECORDS REVIEWED  Outpatient Notes : Recent primary care notes for ongoing management of chronic back pain chronic diarrhea.  Patient takes Imodium daily.    HPI/ROS    LIMITATION TO HISTORY       OUTSIDE HISTORIAN(S):      Josie Prajapati is a 29 y.o. female who presents to the emergency department with chief complaint of dizziness.  Patient reports that she was getting her children ready for school today when she began to feel very lightheaded and dizzy felt as though she was in a pass out.  Did not lose consciousness.  She has had minimal frontal headache.  No chest pain no palpitations no fevers no chills no cough no problems urination or bowel movements she does report that her child is sick with upper respiratory symptoms.  She reports that she did have 1 tall can beer yesterday no other alcohol or drugs and no increased caffeine intake.  She reports that she has not been sleeping well due to sick children.    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    SURGICAL HISTORY  patient denies any surgical history    FAMILY HISTORY  Family History   Problem Relation Age of Onset    Hypertension Mother     Hyperlipidemia Father     Cancer Cousin         leukemia    Diabetes Neg Hx     Heart Disease Neg Hx     Stroke Neg Hx        SOCIAL HISTORY  Social History     Tobacco Use    Smoking status: Never    Smokeless tobacco: Never   Vaping Use    Vaping status: Never Used   Substance and Sexual Activity    Alcohol use: Not Currently     Alcohol/week: 1.8 oz     Types: 3 Cans of beer per week    Drug use: Never     Types: Inhaled     Comment: Arlene-Rarely    Sexual activity: Yes     Partners: Male     Birth control/protection: Implant       CURRENT MEDICATIONS  Home  Medications       Reviewed by Nel Davis R.N. (Registered Nurse) on 02/28/25 at 0933  Med List Status: Partial     Medication Last Dose Status   clobetasol (TEMOVATE) 0.05 % Cream  Active   escitalopram (LEXAPRO) 10 MG Tab  Active   hydrOXYzine HCl (ATARAX) 25 MG Tab  Active   ibuprofen (MOTRIN) 800 MG Tab  Active   loperamide (IMODIUM) 2 MG Cap  Active   ondansetron (ZOFRAN ODT) 4 MG TABLET DISPERSIBLE  Active                    ALLERGIES  No Known Allergies    PHYSICAL EXAM  VITAL SIGNS: /83   Pulse 83   Temp 36.6 °C (97.8 °F) (Temporal)   Resp 18   Wt 68.7 kg (151 lb 7.3 oz)   SpO2 99%   BMI 29.58 kg/m²      Pulse ox interpretation: I interpret this pulse ox as normal.  Constitutional: Alert and oriented x 3, minimal stress  HEENT: Atraumatic normocephalic, pupils are equal round reactive to light extraocular movements are intact. The nares is clear, external ears are normal, mouth shows moist mucous membranes normal dentition for age  Neck: Supple, no JVD no tracheal deviation  Cardiovascular: Regular rate and rhythm no murmur rub or gallop 2+ pulses peripherally x4  Thorax & Lungs: No respiratory distress, no wheezes rales or rhonchi, No chest tenderness.   GI: Soft nontender nondistended positive bowel sounds, no peritoneal signs  Skin: Warm dry no acute rash or lesion  Musculoskeletal: Moving all extremities with full range and 5 of 5 strength no acute  deformity  Neurologic: Cranial nerves III through XII are grossly intact no sensory deficit no cerebellar dysfunction   Psychiatric: Appropriate affect for situation at this time      EKG/LABS  Results for orders placed or performed during the hospital encounter of 02/28/25   EKG    Collection Time: 02/28/25 10:18 AM   Result Value Ref Range    Report       [facility] Emergency Dept.    Test Date:  2025-02-28  Pt Name:    MORENA PINEDA             Department: ER  MRN:        [id number]                       Room:  Gender:     Female                       Technician: [id number]  :        1995                   Requested By:ER TRIAGE PROTOCOL  Order #:    [id number]                    Reading MD: KRAIG MCINTYRE MD    Measurements  Intervals                                Axis  Rate:       75                           P:          57  MD:         150                          QRS:        42  QRSD:       77                           T:          26  QT:         371  QTc:        415    Interpretive Statements  normal sinus rhythm at  75   , no ST elevation no ST depression no T-wave  inversion appropriate R-wave progression normal axis normal intervals no  other ischemic or arrhythmic features  Electronically Signed On 2025 10:18:37 PST by KRAIG MCINTYRE MD     CBC WITH DIFFERENTIAL    Collection Time: 25 10:44 AM   Result Value Ref Range    WBC 9.9 4.8 - 10.8 K/uL    RBC 4.78 4.20 - 5.40 M/uL    Hemoglobin 14.3 12.0 - 16.0 g/dL    Hematocrit 43.3 37.0 - 47.0 %    MCV 90.6 81.4 - 97.8 fL    MCH 29.9 27.0 - 33.0 pg    MCHC 33.0 32.2 - 35.5 g/dL    RDW 39.7 35.9 - 50.0 fL    Platelet Count 274 164 - 446 K/uL    MPV 10.9 9.0 - 12.9 fL    Neutrophils-Polys 72.90 (H) 44.00 - 72.00 %    Lymphocytes 19.30 (L) 22.00 - 41.00 %    Monocytes 4.10 0.00 - 13.40 %    Eosinophils 2.70 0.00 - 6.90 %    Basophils 0.50 0.00 - 1.80 %    Immature Granulocytes 0.50 0.00 - 0.90 %    Nucleated RBC 0.00 0.00 - 0.20 /100 WBC    Neutrophils (Absolute) 7.21 1.82 - 7.42 K/uL    Lymphs (Absolute) 1.91 1.00 - 4.80 K/uL    Monos (Absolute) 0.41 0.00 - 0.85 K/uL    Eos (Absolute) 0.27 0.00 - 0.51 K/uL    Baso (Absolute) 0.05 0.00 - 0.12 K/uL    Immature Granulocytes (abs) 0.05 0.00 - 0.11 K/uL    NRBC (Absolute) 0.00 K/uL   COMP METABOLIC PANEL    Collection Time: 25 10:44 AM   Result Value Ref Range    Sodium 139 135 - 145 mmol/L    Potassium 4.3 3.6 - 5.5 mmol/L    Chloride 107 96 - 112 mmol/L    Co2 22 20 - 33 mmol/L    Anion  Gap 10.0 7.0 - 16.0    Glucose 98 65 - 99 mg/dL    Bun 9 8 - 22 mg/dL    Creatinine 0.61 0.50 - 1.40 mg/dL    Calcium 9.0 8.5 - 10.5 mg/dL    Correct Calcium 8.8 8.5 - 10.5 mg/dL    AST(SGOT) 28 12 - 45 U/L    ALT(SGPT) 40 2 - 50 U/L    Alkaline Phosphatase 80 30 - 99 U/L    Total Bilirubin 0.6 0.1 - 1.5 mg/dL    Albumin 4.3 3.2 - 4.9 g/dL    Total Protein 7.5 6.0 - 8.2 g/dL    Globulin 3.2 1.9 - 3.5 g/dL    A-G Ratio 1.3 g/dL   TROPONIN    Collection Time: 02/28/25 10:44 AM   Result Value Ref Range    Troponin T <6 6 - 19 ng/L   ESTIMATED GFR    Collection Time: 02/28/25 10:44 AM   Result Value Ref Range    GFR (CKD-EPI) 123 >60 mL/min/1.73 m 2   POC CoV-2, FLU A/B, RSV by PCR    Collection Time: 02/28/25 10:57 AM   Result Value Ref Range    POC Influenza A RNA, PCR Negative Negative    POC Influenza B RNA, PCR Negative Negative    POC RSV, by PCR Negative Negative    POC SARS-CoV-2, PCR NotDetected NotDetected   URINALYSIS    Collection Time: 02/28/25 12:02 PM    Specimen: Urine, Clean Catch   Result Value Ref Range    Color Yellow     Character Clear     Specific Gravity 1.015 <1.035    Ph 6.0 5.0 - 8.0    Glucose Negative Negative mg/dL    Ketones Trace (A) Negative mg/dL    Protein Negative Negative mg/dL    Bilirubin Negative Negative    Urobilinogen, Urine 0.2 <=1.0 EU/dL    Nitrite Negative Negative    Leukocyte Esterase Trace (A) Negative    Occult Blood Negative Negative    Micro Urine Req Microscopic    BETA-HCG QUALITATIVE URINE    Collection Time: 02/28/25 12:02 PM   Result Value Ref Range    Beta-Hcg Urine Negative Negative   URINE MICROSCOPIC (W/UA)    Collection Time: 02/28/25 12:02 PM   Result Value Ref Range    WBC 3-5 /hpf    RBC 6-10 (A) 0 - 2 /hpf    Bacteria Moderate (A) None /hpf    Epithelial Cells 6-10 (A) 0 - 5 /hpf    Trans Epithelial Cells Present (A) Absent /hpf    Urine Casts 0-2 0 - 2 /lpf      I have independently interpreted this EKG    RADIOLOGY/PROCEDURES   I have independently  interpreted the diagnostic imaging associated with this visit and am waiting the final reading from the radiologist.   My preliminary interpretation is as follows:     Radiologist interpretation:  DX-CHEST-PORTABLE (1 VIEW)   Final Result      No acute cardiopulmonary disease evident.          COURSE & MEDICAL DECISION MAKING    ASSESSMENT, COURSE AND PLAN  Care Narrative: Patient feeling better at this time.  Labs are all reassuring EKG is normal chest x-ray is clear.  No indication for head CT at this time.  Given instruction return for worsening dizziness lightheadedness weakness any other acute symptom change or concern.  Likely near syncope from over exhaustion and possible some amount of dehydration.  Given instructions on rest oral hydration.  Low risk [place] syncope rule.  Discharged in stable and improved condition.       ADDITIONAL PROBLEMS MANAGED      DISPOSITION AND DISCUSSIONS  I have discussed management of the patient with the following physicians and SAL's:      Discussion of management with other Q or appropriate source(s):      Escalation of care considered, and ultimately not performed:acute inpatient care management, however at this time, the patient is most appropriate for outpatient management    Barriers to care at this time, including but not limited to: .     Decision tools and prescription drugs considered including, but not limited to: .  /72   Pulse 86   Temp 36.4 °C (97.6 °F) (Temporal)   Resp 15   Wt 68.7 kg (151 lb 7.3 oz)   SpO2 100%   BMI 29.58 kg/m²     Michelle Smith M.D.  [address] [id number] [phone]          [facility], Emergency Dept  [address] [facility] [address] [phone]    in 12-24 hours if symptoms persist, immediately If symptoms worsen, or if you develop any other symptoms or concerns      FINAL DIAGNOSIS  1. Near syncope         Electronically signed by: Chano Charles M.D.

## 2025-02-28 NOTE — ED NOTES
Bedside report from QUIQUE Johnson. [place], on monitor, call light in reach.  Pt is on RA, GCS 15.  Necessary fall precautions in place.

## 2025-02-28 NOTE — ED TRIAGE NOTES
".  Chief Complaint   Patient presents with    Dizziness     Onset this am when woke slightly improved when laying down    Nausea     Ambulated to triage c/o feeling dizzy and \"body feeling off\" EKG complete on arrival .  "

## 2025-02-28 NOTE — ED NOTES
[facility]. IV discontinued and gauze placed, pt in possession of belongings. Pt provided discharge education and information pertaining to medications and follow up appointments. Pt received copy of discharge instructions and verbalized understanding.     Vitals:    02/28/25 1303   BP: 132/72   Pulse: 86   Resp: 15   Temp: 36.4 °C (97.6 °F)   SpO2: 100%

## 2025-08-16 DIAGNOSIS — G89.29 CHRONIC BILATERAL LOW BACK PAIN, UNSPECIFIED WHETHER SCIATICA PRESENT: ICD-10-CM

## 2025-08-16 DIAGNOSIS — K52.9 CHRONIC DIARRHEA: ICD-10-CM

## 2025-08-16 DIAGNOSIS — M54.50 CHRONIC BILATERAL LOW BACK PAIN, UNSPECIFIED WHETHER SCIATICA PRESENT: ICD-10-CM

## 2025-08-19 RX ORDER — LOPERAMIDE HYDROCHLORIDE 2 MG/1
2 CAPSULE ORAL 4 TIMES DAILY PRN
Qty: 30 CAPSULE | Refills: 3 | OUTPATIENT
Start: 2025-08-19

## 2025-08-19 RX ORDER — IBUPROFEN 800 MG/1
800 TABLET, FILM COATED ORAL EVERY 8 HOURS PRN
Qty: 30 TABLET | Refills: 3 | OUTPATIENT
Start: 2025-08-19

## 2025-09-10 ENCOUNTER — APPOINTMENT (OUTPATIENT)
Dept: MEDICAL GROUP | Facility: MEDICAL CENTER | Age: 30
End: 2025-09-10
Payer: MEDICAID